# Patient Record
Sex: FEMALE | Employment: UNEMPLOYED | RURAL
[De-identification: names, ages, dates, MRNs, and addresses within clinical notes are randomized per-mention and may not be internally consistent; named-entity substitution may affect disease eponyms.]

---

## 2024-05-08 LAB
ABO AND RH: NORMAL
ANTIBODY SCREEN: NEGATIVE
C TRACH RRNA SPEC QL PROBE: NEGATIVE
HBV SURFACE AG SERPL QL IA: NEGATIVE
HCT VFR BLD AUTO: 39 %
HGB BLD-MCNC: 13 G/DL
HIV 1+2 AB+HIV1 P24 AG SERPL QL IA: NORMAL
MCV RBC AUTO: 83.9 FL (ref 82–108)
N GONORRHOEAE, AMPLIFIED DNA: NEGATIVE
PLATELET # BLD AUTO: 269 K/UL (ref 150–399)
RPR: NORMAL
RUBELLA IGG SCREEN: NORMAL
URINE CULTURE, ROUTINE: NORMAL

## 2024-05-29 LAB — FINAL PATHOLOGIC DIAGNOSIS: NORMAL

## 2024-06-13 DIAGNOSIS — N91.1 SECONDARY AMENORRHEA: Primary | ICD-10-CM

## 2024-06-20 ENCOUNTER — TELEPHONE (OUTPATIENT)
Dept: OBSTETRICS AND GYNECOLOGY | Facility: CLINIC | Age: 39
End: 2024-06-20

## 2024-06-20 NOTE — TELEPHONE ENCOUNTER
----- Message from Jenn Cowart sent at 6/20/2024 10:24 AM CDT -----  Who Called: Dalila Adamson    Caller is requesting assistance/information from provider's office.        Preferred Method of Contact: Phone Call  Patient's Preferred Phone Number on File: [phone]   Best Call Back Number, if different:  Additional Information: Pt is wanting to know about possibly touring facility. NP of Dr. Ibarra

## 2024-07-01 ENCOUNTER — HOSPITAL ENCOUNTER (OUTPATIENT)
Dept: RADIOLOGY | Facility: HOSPITAL | Age: 39
Discharge: HOME | End: 2024-07-01
Attending: ADVANCED PRACTICE MIDWIFE
Payer: COMMERCIAL

## 2024-07-01 ENCOUNTER — INITIAL PRENATAL (OUTPATIENT)
Dept: OBSTETRICS AND GYNECOLOGY | Facility: CLINIC | Age: 39
End: 2024-07-01
Payer: COMMERCIAL

## 2024-07-01 VITALS — SYSTOLIC BLOOD PRESSURE: 110 MMHG | HEART RATE: 78 BPM | WEIGHT: 143 LBS | DIASTOLIC BLOOD PRESSURE: 70 MMHG

## 2024-07-01 DIAGNOSIS — Z3A.20 20 WEEKS GESTATION OF PREGNANCY: ICD-10-CM

## 2024-07-01 DIAGNOSIS — O09.512 PRIMIGRAVIDA OF ADVANCED MATERNAL AGE IN SECOND TRIMESTER: Primary | ICD-10-CM

## 2024-07-01 DIAGNOSIS — N91.1 SECONDARY AMENORRHEA: ICD-10-CM

## 2024-07-01 LAB
BILIRUB SERPL-MCNC: NORMAL MG/DL
BLOOD URINE, POC: NORMAL
COLOR, POC UA: YELLOW
GLUCOSE UR QL STRIP: NORMAL
KETONES UR QL STRIP: NORMAL
LEUKOCYTE ESTERASE URINE, POC: NORMAL
NITRITE, POC UA: NORMAL
PH, POC UA: 5
PROTEIN, POC: NORMAL
SPECIFIC GRAVITY, POC UA: 1.02
UROBILINOGEN, POC UA: NORMAL

## 2024-07-01 PROCEDURE — 81003 URINALYSIS AUTO W/O SCOPE: CPT | Mod: PBBFAC | Performed by: ADVANCED PRACTICE MIDWIFE

## 2024-07-01 PROCEDURE — 99999 PR PBB SHADOW E&M-EST. PATIENT-LVL III: CPT | Mod: PBBFAC,,, | Performed by: ADVANCED PRACTICE MIDWIFE

## 2024-07-01 PROCEDURE — 99213 OFFICE O/P EST LOW 20 MIN: CPT | Mod: PBBFAC | Performed by: ADVANCED PRACTICE MIDWIFE

## 2024-07-01 PROCEDURE — 0500F INITIAL PRENATAL CARE VISIT: CPT | Mod: S$PBB,,, | Performed by: ADVANCED PRACTICE MIDWIFE

## 2024-07-01 PROCEDURE — 99999PBSHW POCT URINALYSIS W/O SCOPE: Mod: PBBFAC,,,

## 2024-07-01 RX ORDER — ONDANSETRON 4 MG/1
TABLET, ORALLY DISINTEGRATING ORAL
COMMUNITY

## 2024-07-01 NOTE — PROGRESS NOTES
Return OB Visit    38 y.o. female  at 20w0d   [facility]  Saw Dr. Barrie MATHEWS last week and US WNL.  Progress notes available and reviewed. No PN records available to review.  Records release signed today to obtain previous records.   Reports occasional fetal movement or fluttering. Denies any vaginal bleeding, leakage of fluid, cramping, contractions, or pressure.   Total weight gain/weight loss in pregnancy: 1.361 kg (3 lb)     Vitals  BP: 110/70  Pulse: 78  Weight: 64.9 kg (143 lb)  Prenatal  Fetal Heart Rate: 140  Movement: Present  Edema  LLE Edema: None  RLE Edema: None  Facial: None  Additional Edema?: No    Prenatal Labs:  Lab Results   Component Value Date    HGB 13.0 2024    HCT 39.0 2024     2024    HEPBSAG Negative 2024    [id number] Non-Reactive 2024    RPR Non-Reactive 2024    LABCHLA Negative 2024    LABNGO Negative 2024    LABURIN No growth at 48 hours 2024     Horizon screen Neg   Panorama low risk/female  Pap 24 Negative    A: 20w0d           ICD-10-CM ICD-9-CM    1. Primigravida of advanced maternal age in second trimester  O09.512 659.53       2. 20 weeks gestation of pregnancy  Z3A.20 V22.2 POCT URINALYSIS W/O SCOPE          The following were addressed during this visit:    1-8 Weeks  - Warning Signs   - Course of Care   - Benefits of Breastfeeding     13-16 Weeks  - Anatomy Ultrasound     -Benefits of breastfeeding   -Rooming In and Skin to Skin    P: Bleeding and  labor/labor precautions discussed.    Discussed midwifery care in collaboration with Dr Amaya.  Reviewed healthy diet, exercise during pregnancy and weight gain recommendations.  Safe OTC med list given and reviewed.    Encouraged [employer] access    Questions answered to desired level of satisfaction  Verbalized understanding to all information and instructions provided.  Follow up in about 4 weeks (around 2024) for  OBV.    Clarisa Ibarra CNM, [facility]

## 2024-07-08 ENCOUNTER — PATIENT MESSAGE (OUTPATIENT)
Dept: OBSTETRICS AND GYNECOLOGY | Facility: CLINIC | Age: 39
End: 2024-07-08
Payer: COMMERCIAL

## 2024-07-08 RX ORDER — HYDROCORTISONE 25 MG/G
CREAM TOPICAL 2 TIMES DAILY
Qty: 28 G | Refills: 3 | Status: SHIPPED | OUTPATIENT
Start: 2024-07-08

## 2024-07-29 ENCOUNTER — ROUTINE PRENATAL (OUTPATIENT)
Dept: OBSTETRICS AND GYNECOLOGY | Facility: CLINIC | Age: 39
End: 2024-07-29
Payer: COMMERCIAL

## 2024-07-29 VITALS — SYSTOLIC BLOOD PRESSURE: 110 MMHG | HEART RATE: 75 BPM | DIASTOLIC BLOOD PRESSURE: 78 MMHG | WEIGHT: 152 LBS

## 2024-07-29 DIAGNOSIS — O09.512 PRIMIGRAVIDA OF ADVANCED MATERNAL AGE IN SECOND TRIMESTER: Primary | ICD-10-CM

## 2024-07-29 DIAGNOSIS — Z3A.24 24 WEEKS GESTATION OF PREGNANCY: ICD-10-CM

## 2024-07-29 DIAGNOSIS — R35.0 URINARY FREQUENCY: ICD-10-CM

## 2024-07-29 LAB
BILIRUB SERPL-MCNC: NORMAL MG/DL
BLOOD URINE, POC: NORMAL
CLARITY, POC UA: CLEAR
COLOR, POC UA: COLORLESS
GLUCOSE UR QL STRIP: NORMAL
KETONES UR QL STRIP: NORMAL
LEUKOCYTE ESTERASE URINE, POC: NORMAL
NITRITE, POC UA: NORMAL
PH, POC UA: 5
PROTEIN, POC: NORMAL
SPECIFIC GRAVITY, POC UA: 1.01
UROBILINOGEN, POC UA: NORMAL

## 2024-07-29 PROCEDURE — 81003 URINALYSIS AUTO W/O SCOPE: CPT | Mod: PBBFAC | Performed by: ADVANCED PRACTICE MIDWIFE

## 2024-07-29 PROCEDURE — 99999PBSHW POCT URINALYSIS W/O SCOPE: Mod: PBBFAC,,,

## 2024-07-29 PROCEDURE — 99999 PR PBB SHADOW E&M-EST. PATIENT-LVL III: CPT | Mod: PBBFAC,,, | Performed by: ADVANCED PRACTICE MIDWIFE

## 2024-07-29 PROCEDURE — 99213 OFFICE O/P EST LOW 20 MIN: CPT | Mod: PBBFAC | Performed by: ADVANCED PRACTICE MIDWIFE

## 2024-07-29 PROCEDURE — 0502F SUBSEQUENT PRENATAL CARE: CPT | Mod: ,,, | Performed by: ADVANCED PRACTICE MIDWIFE

## 2024-07-29 NOTE — PROGRESS NOTES
Return OB Visit    38 y.o. female  at 24w0d   She c/o leg cramps taking 1 magnesium tablet without relief. Recommends 2 tablets before bed, increase hydration and leg stretches. Discussed normal discomforts in pregnancy.  Reports good fetal movement or fluttering. Denies any vaginal bleeding, leakage of fluid, cramping, contractions, or pressure.   Total weight gain/weight loss in pregnancy: 5.443 kg (12 lb)     Vitals  BP: 110/78  Pulse: 75  Weight: 68.9 kg (152 lb)  Prenatal  Fetal Heart Rate: 160  Movement: Present  Edema  LLE Edema: None  RLE Edema: None  Facial: None  Additional Edema?: No    Prenatal Labs:  Lab Results   Component Value Date    HGB 13.0 2024    HCT 39.0 2024     2024    HEPBSAG Negative 2024    [id number] Non-Reactive 2024    RPR Non-Reactive 2024    LABCHLA Negative 2024    LABNGO Negative 2024    LABURIN No growth at 48 hours 2024       A: 24w0d           ICD-10-CM ICD-9-CM    1. Primigravida of advanced maternal age in second trimester  O09.512 659.53 Treponema Pallidum (Syphillis) Antibody      CBC Auto Differential      Glucose, 1Hr Post Prandial      2. 24 weeks gestation of pregnancy  Z3A.24 V22.2 POCT URINALYSIS W/O SCOPE      3. Urinary frequency  R35.0 788.41 POCT URINALYSIS W/O SCOPE          The following were addressed during this visit:    21-24 Weeks  -  Labor Signs   - Travel During Pregnancy   - Gestational diabetes screening protocol   - Effective Position and Latch   - Risks of Formula Use   - Risks of pacifier use     -Benefits of breastfeeding   -Rooming In and Skin to Skin    P: Bleeding, daily fetal kick counts, and  labor/labor precautions discussed.    Questions answered to desired level of satisfaction  Verbalized understanding to all information and instructions provided.  Follow up in about 4 weeks (around 2024) for OBV, 1hr GTT.    Clarisa Ibarra CNM, [facility]

## 2024-08-26 ENCOUNTER — PATIENT MESSAGE (OUTPATIENT)
Dept: OBSTETRICS AND GYNECOLOGY | Facility: CLINIC | Age: 39
End: 2024-08-26
Payer: COMMERCIAL

## 2024-08-26 ENCOUNTER — ROUTINE PRENATAL (OUTPATIENT)
Dept: OBSTETRICS AND GYNECOLOGY | Facility: CLINIC | Age: 39
End: 2024-08-26
Payer: COMMERCIAL

## 2024-08-26 VITALS — HEART RATE: 87 BPM | WEIGHT: 157.19 LBS | SYSTOLIC BLOOD PRESSURE: 126 MMHG | DIASTOLIC BLOOD PRESSURE: 74 MMHG

## 2024-08-26 DIAGNOSIS — Z3A.28 28 WEEKS GESTATION OF PREGNANCY: ICD-10-CM

## 2024-08-26 DIAGNOSIS — O99.810 ABNORMAL GLUCOSE TOLERANCE IN PREGNANCY: Primary | ICD-10-CM

## 2024-08-26 DIAGNOSIS — O09.512 PRIMIGRAVIDA OF ADVANCED MATERNAL AGE IN SECOND TRIMESTER: Primary | ICD-10-CM

## 2024-08-26 DIAGNOSIS — R35.0 URINARY FREQUENCY: ICD-10-CM

## 2024-08-26 LAB
BILIRUB SERPL-MCNC: NORMAL MG/DL
BLOOD URINE, POC: NORMAL
CLARITY, POC UA: CLEAR
COLOR, POC UA: YELLOW
GLUCOSE UR QL STRIP: NORMAL
KETONES UR QL STRIP: NORMAL
LEUKOCYTE ESTERASE URINE, POC: NORMAL
NITRITE, POC UA: NORMAL
PH, POC UA: 5
PROTEIN, POC: NORMAL
SPECIFIC GRAVITY, POC UA: 1.02
UROBILINOGEN, POC UA: NORMAL

## 2024-08-26 PROCEDURE — 99213 OFFICE O/P EST LOW 20 MIN: CPT | Mod: PBBFAC | Performed by: ADVANCED PRACTICE MIDWIFE

## 2024-08-26 PROCEDURE — 99999PBSHW POCT URINALYSIS W/O SCOPE: Mod: PBBFAC,,,

## 2024-08-26 PROCEDURE — 99999 PR PBB SHADOW E&M-EST. PATIENT-LVL III: CPT | Mod: PBBFAC,,, | Performed by: ADVANCED PRACTICE MIDWIFE

## 2024-08-26 PROCEDURE — 0502F SUBSEQUENT PRENATAL CARE: CPT | Mod: ,,, | Performed by: ADVANCED PRACTICE MIDWIFE

## 2024-08-26 PROCEDURE — 81003 URINALYSIS AUTO W/O SCOPE: CPT | Mod: PBBFAC | Performed by: ADVANCED PRACTICE MIDWIFE

## 2024-08-26 NOTE — PROGRESS NOTES
Return OB Visit    39 y.o. female  at 28w0d   She c/o possible fever yesterday.  Was packing to move on Saturday. Rested yesterday and felt better later that afternoon. 1hr GTT today  Reports good fetal movement or fluttering. Denies any vaginal bleeding, leakage of fluid, cramping, contractions, or pressure.   Total weight gain/weight loss in pregnancy: 7.802 kg (17 lb 3.2 oz)     Vitals  BP: 126/74  Pulse: 87  Weight: 71.3 kg (157 lb 3.2 oz)  Prenatal  Fundal Height (cm): 29 cm  Fetal Heart Rate: 150s  Edema  LLE Edema: None  RLE Edema: None  Facial: None  Additional Edema?: No    Prenatal Labs:  Lab Results   Component Value Date    HGB 13.0 2024    HCT 39.0 2024     2024    HEPBSAG Negative 2024    [id number] Non-Reactive 2024    RPR Non-Reactive 2024    LABCHLA Negative 2024    LABNGO Negative 2024    LABURIN No growth at 48 hours 2024       A: 28w0d           ICD-10-CM ICD-9-CM    1. Primigravida of advanced maternal age in second trimester  O09.512 659.53 US OB FU Ea Gestation Transabdominal      2. 28 weeks gestation of pregnancy  Z3A.28 V22.2       3. Urinary frequency  R35.0 788.41 POCT URINALYSIS W/O SCOPE          The following were addressed during this visit:    25-28 Weeks  -  Labor Signs   - Weight Gain/Diet/Exercise     -Benefits of breastfeeding   -Rooming In and Skin to Skin    P: Bleeding, daily fetal kick counts, and  labor/labor precautions discussed.    Questions answered to desired level of satisfaction  Verbalized understanding to all information and instructions provided.  Follow up in about 2 weeks (around 2024) for OBV, Ultrasound.    Clarisa Ibarra CNM, [facility]

## 2024-08-27 ENCOUNTER — PATIENT MESSAGE (OUTPATIENT)
Dept: OBSTETRICS AND GYNECOLOGY | Facility: CLINIC | Age: 39
End: 2024-08-27
Payer: COMMERCIAL

## 2024-08-27 DIAGNOSIS — O24.419 GESTATIONAL DIABETES MELLITUS (GDM) IN THIRD TRIMESTER, GESTATIONAL DIABETES METHOD OF CONTROL UNSPECIFIED: Primary | ICD-10-CM

## 2024-08-28 ENCOUNTER — HOSPITAL ENCOUNTER (EMERGENCY)
Facility: HOSPITAL | Age: 39
Discharge: HOME | End: 2024-08-28
Payer: COMMERCIAL

## 2024-08-28 VITALS
HEIGHT: 64 IN | BODY MASS INDEX: 26.63 KG/M2 | TEMPERATURE: 98 F | DIASTOLIC BLOOD PRESSURE: 82 MMHG | WEIGHT: 156 LBS | RESPIRATION RATE: 16 BRPM | HEART RATE: 86 BPM | SYSTOLIC BLOOD PRESSURE: 129 MMHG | OXYGEN SATURATION: 99 %

## 2024-08-28 DIAGNOSIS — L03.114 CELLULITIS OF LEFT UPPER EXTREMITY: Primary | ICD-10-CM

## 2024-08-28 LAB
ANION GAP SERPL CALCULATED.3IONS-SCNC: 11 MMOL/L (ref 7–16)
BASOPHILS # BLD AUTO: 0.04 K/UL (ref 0–0.2)
BASOPHILS NFR BLD AUTO: 0.3 % (ref 0–1)
BUN SERPL-MCNC: 10 MG/DL (ref 7–18)
BUN/CREAT SERPL: 16 (ref 6–20)
CALCIUM SERPL-MCNC: 9.9 MG/DL (ref 8.5–10.1)
CHLORIDE SERPL-SCNC: 105 MMOL/L (ref 98–107)
CO2 SERPL-SCNC: 23 MMOL/L (ref 21–32)
CREAT SERPL-MCNC: 0.63 MG/DL (ref 0.55–1.02)
DIFFERENTIAL METHOD BLD: ABNORMAL
EGFR (NO RACE VARIABLE) (RUSH/TITUS): 117 ML/MIN/1.73M2
EOSINOPHIL # BLD AUTO: 0.32 K/UL (ref 0–0.5)
EOSINOPHIL NFR BLD AUTO: 2.4 % (ref 1–4)
ERYTHROCYTE [DISTWIDTH] IN BLOOD BY AUTOMATED COUNT: 11.9 % (ref 11.5–14.5)
GLUCOSE SERPL-MCNC: 120 MG/DL (ref 74–106)
HCT VFR BLD AUTO: 35.1 % (ref 38–47)
HGB BLD-MCNC: 11.8 G/DL (ref 12–16)
IMM GRANULOCYTES # BLD AUTO: 0.07 K/UL (ref 0–0.04)
IMM GRANULOCYTES NFR BLD: 0.5 % (ref 0–0.4)
LYMPHOCYTES # BLD AUTO: 2.32 K/UL (ref 1–4.8)
LYMPHOCYTES NFR BLD AUTO: 17.5 % (ref 27–41)
MCH RBC QN AUTO: 28.2 PG (ref 27–31)
MCHC RBC AUTO-ENTMCNC: 33.6 G/DL (ref 32–36)
MCV RBC AUTO: 84 FL (ref 80–96)
MONOCYTES # BLD AUTO: 0.95 K/UL (ref 0–0.8)
MONOCYTES NFR BLD AUTO: 7.2 % (ref 2–6)
MPC BLD CALC-MCNC: 10.2 FL (ref 9.4–12.4)
NEUTROPHILS # BLD AUTO: 9.55 K/UL (ref 1.8–7.7)
NEUTROPHILS NFR BLD AUTO: 72.1 % (ref 53–65)
NRBC # BLD AUTO: 0 X10E3/UL
NRBC, AUTO (.00): 0 %
PLATELET # BLD AUTO: 273 K/UL (ref 150–400)
POTASSIUM SERPL-SCNC: 3.1 MMOL/L (ref 3.5–5.1)
RBC # BLD AUTO: 4.18 M/UL (ref 4.2–5.4)
SODIUM SERPL-SCNC: 136 MMOL/L (ref 136–145)
WBC # BLD AUTO: 13.25 K/UL (ref 4.5–11)

## 2024-08-28 PROCEDURE — 36415 COLL VENOUS BLD VENIPUNCTURE: CPT

## 2024-08-28 PROCEDURE — 63600175 PHARM REV CODE 636 W HCPCS

## 2024-08-28 PROCEDURE — 25000003 PHARM REV CODE 250

## 2024-08-28 PROCEDURE — 85025 COMPLETE CBC W/AUTO DIFF WBC: CPT

## 2024-08-28 PROCEDURE — 80048 BASIC METABOLIC PNL TOTAL CA: CPT

## 2024-08-28 PROCEDURE — 99283 EMERGENCY DEPT VISIT LOW MDM: CPT

## 2024-08-28 RX ORDER — FAMOTIDINE 20 MG/1
20 TABLET, FILM COATED ORAL
Status: COMPLETED | OUTPATIENT
Start: 2024-08-28 | End: 2024-08-28

## 2024-08-28 RX ORDER — PREDNISONE 10 MG/1
20 TABLET ORAL
Status: COMPLETED | OUTPATIENT
Start: 2024-08-28 | End: 2024-08-28

## 2024-08-28 RX ORDER — DIPHENHYDRAMINE HCL 25 MG
25 CAPSULE ORAL
Status: COMPLETED | OUTPATIENT
Start: 2024-08-28 | End: 2024-08-28

## 2024-08-28 RX ORDER — CLINDAMYCIN HYDROCHLORIDE 300 MG/1
300 CAPSULE ORAL EVERY 6 HOURS
Qty: 40 CAPSULE | Refills: 0 | Status: SHIPPED | OUTPATIENT
Start: 2024-08-28 | End: 2024-09-07

## 2024-08-28 RX ADMIN — FAMOTIDINE 20 MG: 20 TABLET, FILM COATED ORAL at 09:08

## 2024-08-28 RX ADMIN — PREDNISONE 20 MG: 10 TABLET ORAL at 09:08

## 2024-08-28 RX ADMIN — DIPHENHYDRAMINE HYDROCHLORIDE 25 MG: 25 CAPSULE ORAL at 09:08

## 2024-08-29 NOTE — ED TRIAGE NOTES
Pt presents cc of ant bite from yesterday, today red streaks started around 1:30pm. Pt has had benedryl about an hour ago.

## 2024-08-29 NOTE — DISCHARGE INSTRUCTIONS
Take antibiotic as ordered. Take with food and drink plenty of water. Follow up with primary care provider in five days for re-evaluation. Return to the emergency department for new or worsening symptoms.

## 2024-08-29 NOTE — ED PROVIDER NOTES
Encounter Date: 8/28/2024       History     Chief Complaint   Patient presents with    Insect Bite     With red streaks going up her arm     Patient is a 39-year old female who presents to the emergency department for evaluation of ant bite. Patient reports that she was bitten by ants yesterday and began having pain and swelling to left hand with streaking up her left forearm this afternoon. Denies fever, chills, nausea, vomiting, shortness of breath, wheezing.    The history is provided by the patient. No  was used.   Animal Bite   The incident occurred yesterday. The incident occurred at home. She came to the ER via by private vehicle. There is an injury to the Left forearm and left hand. The pain is at a severity of 7/10. It is unlikely that a foreign body is present. Pertinent negatives include no chest pain, no altered mental status, no visual disturbance, no nausea, no vomiting, no bladder incontinence, no headaches, no inability to bear weight, no neck pain, no pain when bearing weight, no weakness and no difficulty breathing.     Review of patient's allergies indicates:  No Known Allergies  History reviewed. No pertinent past medical history.  Past Surgical History:   Procedure Laterality Date    TONSILLECTOMY       Family History   Problem Relation Name Age of Onset    Breast cancer Neg Hx      Colon cancer Neg Hx      Ovarian cancer Neg Hx       Social History     Tobacco Use    Smoking status: Never    Smokeless tobacco: Never   Substance Use Topics    Alcohol use: Not Currently    Drug use: Never     Review of Systems   Constitutional:  Negative for chills and fever.   HENT:  Negative for congestion, facial swelling and trouble swallowing.    Eyes:  Negative for pain and visual disturbance.   Respiratory:  Negative for chest tightness, shortness of breath, wheezing and stridor.    Cardiovascular:  Negative for chest pain, palpitations and leg swelling.   Gastrointestinal:  Negative  for nausea and vomiting.   Genitourinary:  Negative for bladder incontinence, decreased urine volume, difficulty urinating and dysuria.   Musculoskeletal:  Negative for back pain, neck pain and neck stiffness.   Neurological:  Negative for dizziness, tremors, weakness and headaches.   Psychiatric/Behavioral:  Negative for confusion.    All other systems reviewed and are negative.      Physical Exam     Initial Vitals   BP Pulse Resp Temp SpO2   08/28/24 2016 08/28/24 2016 08/28/24 2018 08/28/24 2016 08/28/24 2016   129/82 86 16 98 °F (36.7 °C) 99 %      MAP       --                Physical Exam    Vitals reviewed.  Constitutional: She appears well-nourished. No distress.   HENT:   Head: Normocephalic.   Eyes: Conjunctivae are normal. Pupils are equal, round, and reactive to light. Right eye exhibits no discharge. Left eye exhibits no discharge.   Neck: Neck supple.   Normal range of motion.  Cardiovascular:  Normal rate, regular rhythm and normal heart sounds.           Pulmonary/Chest: Breath sounds normal. No respiratory distress. She has no wheezes. She has no rhonchi. She exhibits no tenderness.   Musculoskeletal:      Cervical back: Normal range of motion and neck supple.     Lymphadenopathy:     She has no cervical adenopathy.   Neurological: She is alert and oriented to person, place, and time. GCS score is 15. GCS eye subscore is 4. GCS verbal subscore is 5. GCS motor subscore is 6.   Skin: Skin is warm and dry. Capillary refill takes less than 2 seconds.   Swelling and erythema noted to left hand with no bleeding or drainage noted. Streaking noted up left forearm with no complaints of pruritus/swelling to left forearm   Psychiatric: She has a normal mood and affect. Her behavior is normal.         Medical Screening Exam   See Full Note    ED Course   Procedures  Labs Reviewed   BASIC METABOLIC PANEL - Abnormal       Result Value    Sodium 136      Potassium 3.1 (*)     Chloride 105      CO2 23      Anion  Gap 11      Glucose 120 (*)     BUN 10      Creatinine 0.63      BUN/Creatinine Ratio 16      Calcium 9.9      eGFR 117     CBC WITH DIFFERENTIAL - Abnormal    WBC 13.25 (*)     RBC 4.18 (*)     Hemoglobin 11.8 (*)     Hematocrit 35.1 (*)     MCV 84.0      MCH 28.2      MCHC 33.6      RDW 11.9      Platelet Count 273      MPV 10.2      Neutrophils % 72.1 (*)     Lymphocytes % 17.5 (*)     Monocytes % 7.2 (*)     Eosinophils % 2.4      Basophils % 0.3      Immature Granulocytes % 0.5 (*)     nRBC, Auto 0.0      Neutrophils, Abs 9.55 (*)     Lymphocytes, Absolute 2.32      Monocytes, Absolute 0.95 (*)     Eosinophils, Absolute 0.32      Basophils, Absolute 0.04      Immature Granulocytes, Absolute 0.07 (*)     nRBC, Absolute 0.00      Diff Type Auto     CBC W/ AUTO DIFFERENTIAL    Narrative:     The following orders were created for panel order CBC auto differential.  Procedure                               Abnormality         Status                     ---------                               -----------         ------                     CBC with Differential[[id number]]       Abnormal            Final result                 Please view results for these tests on the individual orders.          Imaging Results    None          Medications   predniSONE tablet 20 mg (20 mg Oral Given 8/28/24 2109)   famotidine tablet 20 mg (20 mg Oral Given 8/28/24 2109)   diphenhydrAMINE capsule 25 mg (25 mg Oral Given 8/28/24 2109)     Medical Decision Making  Patient is a 39-year old female who presents to the emergency department for evaluation of ant bite. Patient reports that she was bitten by ants yesterday and began having pain and swelling to left hand with streaking up her left forearm this afternoon. Denies fever, chills, nausea, vomiting, shortness of breath, wheezing.  Ordered and reviewed labs with results significant for elevated WBC  Ordered benadryl, prednisone and pepcid PO in ED  Prescribed clindamycin for  discharge  Diagnosis: Cellulitis of left upper extremity    Amount and/or Complexity of Data Reviewed  Labs: ordered.    Risk  OTC drugs.  Prescription drug management.                                      Clinical Impression:   Final diagnoses:  [L03.114] Cellulitis of left upper extremity (Primary)        ED Disposition Condition    Discharge Stable          ED Prescriptions       Medication Sig Dispense Start Date End Date Auth. Provider    clindamycin (CLEOCIN) 300 MG capsule Take 1 capsule (300 mg total) by mouth every 6 (six) hours. for 10 days 40 capsule 8/28/2024 9/7/2024 Neeraj Pond, MARTÍNEZ          Follow-up Information    None          Neeraj Pond, MARTÍNEZ  08/28/24 5609

## 2024-08-30 ENCOUNTER — PATIENT MESSAGE (OUTPATIENT)
Dept: OBSTETRICS AND GYNECOLOGY | Facility: CLINIC | Age: 39
End: 2024-08-30
Payer: COMMERCIAL

## 2024-08-31 ENCOUNTER — PATIENT MESSAGE (OUTPATIENT)
Dept: OBSTETRICS AND GYNECOLOGY | Facility: CLINIC | Age: 39
End: 2024-08-31
Payer: COMMERCIAL

## 2024-09-03 ENCOUNTER — PATIENT MESSAGE (OUTPATIENT)
Dept: DIABETES | Facility: CLINIC | Age: 39
End: 2024-09-03
Payer: COMMERCIAL

## 2024-09-03 RX ORDER — BLOOD-GLUCOSE CONTROL, NORMAL
100 EACH MISCELLANEOUS 4 TIMES DAILY
Qty: 100 EACH | Refills: 11 | Status: SHIPPED | OUTPATIENT
Start: 2024-09-03 | End: 2025-09-03

## 2024-09-03 RX ORDER — DEXTROSE 4 G
1 TABLET,CHEWABLE ORAL 4 TIMES DAILY
Qty: 1 EACH | Refills: 0 | Status: SHIPPED | OUTPATIENT
Start: 2024-09-03 | End: 2024-09-04

## 2024-09-04 ENCOUNTER — CLINICAL SUPPORT (OUTPATIENT)
Dept: DIABETES | Facility: CLINIC | Age: 39
End: 2024-09-04
Payer: COMMERCIAL

## 2024-09-04 VITALS — BODY MASS INDEX: 27.04 KG/M2 | HEIGHT: 64 IN | WEIGHT: 158.38 LBS

## 2024-09-04 DIAGNOSIS — O24.419 GESTATIONAL DIABETES MELLITUS (GDM) IN THIRD TRIMESTER, GESTATIONAL DIABETES METHOD OF CONTROL UNSPECIFIED: ICD-10-CM

## 2024-09-04 PROCEDURE — 99999PBSHW PR PBB SHADOW TECHNICAL ONLY FILED TO HB: Mod: PBBFAC,,,

## 2024-09-04 PROCEDURE — 99213 OFFICE O/P EST LOW 20 MIN: CPT | Mod: PBBFAC

## 2024-09-04 PROCEDURE — 99999 PR PBB SHADOW E&M-EST. PATIENT-LVL III: CPT | Mod: PBBFAC,,,

## 2024-09-04 PROCEDURE — G0108 DIAB MANAGE TRN  PER INDIV: HCPCS | Mod: PBBFAC | Performed by: INTERNAL MEDICINE

## 2024-09-04 NOTE — PROGRESS NOTES
"Diabetes Care Specialist Progress Note  Author: TEDDY MIRANDA RN  Date: 2024    Intake    Program Intake  Reason for Diabetes Program Visit: Initial Diabetes Assessment  Current diabetes risk level::  (N/A: Has Gestational Diabetes)  In the last 12 months, have you been to the ED or admitted to the hospital?  used emergency room services  Was the ER or hospital admission related to diabetes?: No  Permission to speak with others about care:: no    38 y/o failed her OGTT and referred to Diabetes Care Specialist for Gestational Diabetes Teaching. She is , 29w2d, AYAN 2024.  Referred by Clarisa Ibarra CNM    Weight: 71.8 kg (158 lb 6.4 oz)   Height: 5' 4" (162.6 cm)   Body mass index is 27.19 kg/m².    Lifestyle Coping Support & Clinical    Lifestyle/Coping/Support  Compared to other people your age, how would you rate your health?: Excellent  Does anyone in your family have diabetes or does anyone in your family support you in your diabetes care?:   Learning Barriers:: None  Culture or Shinto beliefs that may impact ability to access healthcare: No  Psychosocial/Coping Skills Assessment Completed: : Yes  Assessment indicates:: Adequate understanding  Area of need?: No    Diabetes Self-Management Skills Assessment    Medication Skills Assessment  Area of need?: No (No Diabetes Medication Prescribed)    Diabetes Disease Process/Treatment Options  Diabetes Type?: Gestational  When were you diagnosed?: Failed OGTT 2024  What are your goals for this education session?: Learn how to manage GD and have a healthy baby  Is patient aware of what causes diabetes?: No  Does patient understand the pathophysiology of diabetes?: No  Diabetes Disease Process/Treatment Options: Skills Assessment Completed: Yes  Assessment indicates:: Instruction Needed, Knowledge deficit  Area of need?: Yes    Nutrition/Healthy Eating  Meal Plan 24 Hour Recall - Breakfast: twice in the morinin:30: cereal (whole wheat " without sugar) organic milk from [employer] (2nd: 8am: Baking bread (sour dough with wheat, rye flower, honey) 1 slice with butter and ham, tea unsweeted  Meal Plan 24 Hour Recall - Lunch: 2pm: mashed potatoe's( made butter, milk, salt, pepper) sausage boiled, sourkraut, water  Meal Plan 24 Hour Recall - Dinner: Pizza (made herself with whole wheat flour) thin crust (tomato sauce, ham, mushrooms and cheese)  ate 3 slices. water  Meal Plan 24 Hour Recall - Snack: Yogert (us greek / whole milk) mix with berries Muesi  Meal Plan 24 Hour Recall - Beverage: Water, milk, diet drink  Who shops/cooks?: Self    Patient can identify foods that impact blood sugar.: no (see comments) (Going to  glucose meter after leaving here today.)  Challenges to healthy eating:: other (see comments) (Knowledge deficit of what to eat)  Nutrition/Healthy Eating Skills Assessment Completed:: Yes  Assessment indicates:: Knowledge deficit, Instruction Needed  Area of need?: Yes    Physical Activity/Exercise  Patient's daily activity level:: constantly moving  Physical Activity/Exercise Skills Assessment Completed: : Yes  Assessment indicates:: Knowledge deficit, Instruction Needed  Area of need?: Yes    Home Blood Glucose Monitoring  Patient states that blood sugar is checked at home daily.: no  Home Blood Glucose Monitoring Skills Assessment Completed: : Yes  Assessment indicates:: Instruction Needed, Knowledge deficit  Area of need?: Yes    Acute Complications  Acute Complications Skills Assessment Completed: : Yes  Assessment indicates:: Knowledge deficit, Instruction Needed  Area of need?: Yes    Chronic Complications  Chronic Complications Skills Assessment Completed: : Yes  Assessment indicates:: Knowledge deficit, Instruction Needed  Area of need?: Yes      Assessment Summary and Plan    Based on today's diabetes care assessment, the following areas of need were identified:          9/4/2024       Areas of Need   Medications/Current  Diabetes Treatment N/A: Not taking any medications for diabetes     Lifestyle Coping Support Has good support from  and family.      Diabetes Disease Process/Treatment Options Yes - Reviewed gestational diabetes pathophysiology, signs and symptoms, risk factors and treatment guidelines. Handout on Gestational Diabetes provided.      Nutrition/Healthy Eating Yes - See Care Plan     Physical Activity/Exercise Yes - See Care Plan     Home Blood Glucose Monitoring Yes - See Care Plan     Acute Complications Yes:  Discussed risk to her and baby during pregnancy if blood sugar isn't controlled. Risk to baby: grow too large, blood sugar low at birth, low oxygen, stillbirth, Jaundice, injury during delivery. Risk to her: high blood pressure, infections, preeclampsia, . Handout provided.      Chronic Complications Yes  - Discussed future risk to her and baby if blood sugar is not controlled during pregnancy.  Baby: obese and at risk for diabetes in the future and same for her if she doesn't make realistic lifestyle and behavior changes. Post pregnancy: (Continue eating healthy, exercising regularly, keep weight down.  Discussed prevention by getting blood sugar checked in 4-6 weeks after delivery and having regular checkups by HCP every 1-3 years as recommended.)  Handout provided.          Today's interventions were provided through individual discussion, instruction, and written materials were provided.      Patient verbalized understanding of instruction and written materials.  Pt was able to return back demonstration of instructions today. Patient understood key points, needs reinforcement and further instruction.     Diabetes Self-Management Care Plan:    Today's Diabetes Self-Management Care Plan was developed with Dalila's input. Dalila has agreed to work toward the following goal(s) to improve his/her overall diabetes control.      Care Plan: Diabetes Management        Problem: Healthy Eating          Goal: Eat 3 meals daily with 30-60g/2-4 servings of carbohydrates per meal    Start Date: 9/4/2024   Expected End Date: 9/9/2024   Barriers: No Barriers Identified   Note:    Reviewed the sources and role of Carbohydrate, Protein, and Fat and how each nutrient impact blood sugar. Provided meal-planning instruction via food lists, plate method, food labels.  Discussed carbohydrate counting and spacing. Discussed strategies for healthier dining out and replacing sugary beverages with water and other noncaloric, sugar free options.  A diet on GD provided including foods recommended and foods not recommended while Pregnant. Discussed eating 3 meals a day and not going 3-5 hours apart. This helps keep your blood glucose stable and helps you to be less hungry. Handouts on healthy choices / snacks provided.        Problem: Physical Activity and Exercise         Goal: Patient agrees to increase physical activity to a goal of 5 times per week for 30 minutes.    Start Date: 9/4/2024   Expected End Date: 9/9/2024   Barriers: No Barriers Identified   Note:    Discussed role of physical activity on reducing insulin resistance and improvement in overall glycemic control.  Instructed to walk 30 minutes a day 5 times per week.  Provided a handout on pregnancy and exercise when it's safe and not safe to exercise and call your HCP. Patient verbalizes understanding.        Problem: Blood Glucose Self-Monitoring         Goal: Patient agrees to check and record blood sugars 4 times per day. (Fasting ane 2 hours PP meals) Bring log on all visits    Start Date: 9/4/2024   Expected End Date: 9/9/2024   Barriers: No Barriers Identified   Note:    Reviewed benefits, techniques of self-monitoring blood glucose. Discussed appropriate timing and frequency to SMBG, education on parameters on when to notify provider and advised patient to bring logs to all appts with PCP/Endocrinologist/Diabetes Care Specialist. Handout provided on factors  that affect blood sugar and blood sugar parameters for people with GD. (<95 fasting and <120 2 hours PP)      Task: Reviewed the importance of self-monitoring blood glucose and keeping logs. Completed 9/4/2024        Task: Instructed on how to self-monitor blood glucose using a home glucometer, how to properly dispose of used strips and lancets after use, and how to appropriately store meter and supplies. Completed 9/4/2024        Task: Discussed ways to minimize pain when monitoring blood glucose. Completed 9/4/2024          Follow Up Plan     Follow up in about 5 days (around 9/9/2024).    Today's care plan and follow up schedule was discussed with patient.  Dalila verbalized understanding of the care plan, goals, and agrees to follow up plan.        The patient was encouraged to communicate with his/her health care provider/physician and care team regarding his/her condition(s) and treatment.  I provided the patient with my contact information today and encouraged to contact me via phone or [facility] Patient Portal as needed.     Length of Visit   Total Time: 90 Minutes

## 2024-09-04 NOTE — LETTER
September 4, 2024      Clarisa Ibarra CNM  [address] [facility] [place] [address]         Patient: Dalila Adamson   MR Number: [id number]   YOB: 1985   Date of Visit: 9/4/2024       Dear Clarisa Ibarra:    Thank you for referring Dalila for diabetes self-management education and support services. She had an initial visit today.  My note is in Epic.  She is to follow up with me on 9/9/2024.     Patient Outcomes:    Has GD failed OGTT.     Care Plan: Diabetes Management        Problem: Healthy Eating         Goal: Eat 3 meals daily with 30-60g/2-4 servings of carbohydrates per meal    Start Date: 9/4/2024   Expected End Date: 9/9/2024   Barriers: No Barriers Identified   Note:    Reviewed the sources and role of Carbohydrate, Protein, and Fat and how each nutrient impact blood sugar. Provided meal-planning instruction via food lists, plate method, food labels.  Discussed carbohydrate counting and spacing. Discussed strategies for healthier dining out and replacing sugary beverages with water and other noncaloric, sugar free options.  A diet on GD provided including foods recommended and foods not recommended while Pregnant. Discussed eating 3 meals a day and not going 3-5 hours apart. This helps keep your blood glucose stable and helps you to be less hungry. Handouts on healthy choices / snacks provided.      Task: Review the importance of balancing carbohydrates with each meal using portion control techniques to count servings of carbohydrate and label reading to identify serving size and amount of total carbs per serving. Completed 9/4/2024        Task: Provided Sample plate method and reviewed the use of the plate to estimate amounts of carbohydrate per meal. Completed 9/4/2024        Problem: Physical Activity and Exercise         Goal: Patient agrees to increase physical activity to a goal of 5 times per week for 30 minutes.    Start Date: 9/4/2024   Expected End Date: 9/9/2024   Barriers: No Barriers  Identified   Note:    Discussed role of physical activity on reducing insulin resistance and improvement in overall glycemic control.  Instructed to walk 30 minutes a day 5 times per week.  Provided a handout on pregnancy and exercise when it's safe and not safe to exercise and call your HCP. Patient verbalizes understanding.        Problem: Blood Glucose Self-Monitoring         Goal: Patient agrees to check and record blood sugars 4 times per day. (Fasting ane 2 hours PP meals) Bring log on all visits    Start Date: 9/4/2024   Expected End Date: 9/9/2024   Barriers: No Barriers Identified   Note:    Reviewed benefits, techniques of self-monitoring blood glucose. Discussed appropriate timing and frequency to SMBG, education on parameters on when to notify provider and advised patient to bring logs to all appts with PCP/Endocrinologist/Diabetes Care Specialist. Handout provided on factors that affect blood sugar and blood sugar parameters for people with GD. (<95 fasting and <120 2 hours PP)      Task: Reviewed the importance of self-monitoring blood glucose and keeping logs. Completed 9/4/2024        Task: Instructed on how to self-monitor blood glucose using a home glucometer, how to properly dispose of used strips and lancets after use, and how to appropriately store meter and supplies. Completed 9/4/2024        Task: Discussed ways to minimize pain when monitoring blood glucose. Completed 9/4/2024          Follow up:   Dalila to attend medical appointments as scheduled  Dalila to update you on her DM education progress as needed      If you have questions, please do not hesitate to call me. I look forward to providing additional education and support as needed.    Sincerely,  Nathalie Patel RN  Diabetes Care and

## 2024-09-09 ENCOUNTER — CLINICAL SUPPORT (OUTPATIENT)
Dept: DIABETES | Facility: CLINIC | Age: 39
End: 2024-09-09
Payer: COMMERCIAL

## 2024-09-09 ENCOUNTER — HOSPITAL ENCOUNTER (OUTPATIENT)
Dept: RADIOLOGY | Facility: HOSPITAL | Age: 39
Discharge: HOME | End: 2024-09-09
Attending: ADVANCED PRACTICE MIDWIFE
Payer: COMMERCIAL

## 2024-09-09 ENCOUNTER — ROUTINE PRENATAL (OUTPATIENT)
Dept: OBSTETRICS AND GYNECOLOGY | Facility: CLINIC | Age: 39
End: 2024-09-09
Attending: ADVANCED PRACTICE MIDWIFE
Payer: COMMERCIAL

## 2024-09-09 VITALS
SYSTOLIC BLOOD PRESSURE: 133 MMHG | WEIGHT: 158 LBS | DIASTOLIC BLOOD PRESSURE: 80 MMHG | BODY MASS INDEX: 27.12 KG/M2 | HEART RATE: 89 BPM

## 2024-09-09 DIAGNOSIS — O24.410 DIET CONTROLLED GESTATIONAL DIABETES MELLITUS (GDM) IN THIRD TRIMESTER: Primary | ICD-10-CM

## 2024-09-09 DIAGNOSIS — R35.0 URINARY FREQUENCY: ICD-10-CM

## 2024-09-09 DIAGNOSIS — O09.512 PRIMIGRAVIDA OF ADVANCED MATERNAL AGE IN SECOND TRIMESTER: ICD-10-CM

## 2024-09-09 DIAGNOSIS — Z3A.30 30 WEEKS GESTATION OF PREGNANCY: ICD-10-CM

## 2024-09-09 DIAGNOSIS — O09.513 PRIMIGRAVIDA OF ADVANCED MATERNAL AGE IN THIRD TRIMESTER: Primary | ICD-10-CM

## 2024-09-09 PROCEDURE — 99999 PR PBB SHADOW E&M-EST. PATIENT-LVL III: CPT | Mod: PBBFAC,,, | Performed by: ADVANCED PRACTICE MIDWIFE

## 2024-09-09 PROCEDURE — G0108 DIAB MANAGE TRN  PER INDIV: HCPCS | Mod: PBBFAC | Performed by: INTERNAL MEDICINE

## 2024-09-09 PROCEDURE — 0502F SUBSEQUENT PRENATAL CARE: CPT | Mod: ,,, | Performed by: ADVANCED PRACTICE MIDWIFE

## 2024-09-09 PROCEDURE — 99212 OFFICE O/P EST SF 10 MIN: CPT | Mod: PBBFAC,25

## 2024-09-09 PROCEDURE — 76816 OB US FOLLOW-UP PER FETUS: CPT | Mod: 26,,, | Performed by: RADIOLOGY

## 2024-09-09 PROCEDURE — 99999 PR PBB SHADOW E&M-EST. PATIENT-LVL II: CPT | Mod: PBBFAC,,,

## 2024-09-09 PROCEDURE — 99213 OFFICE O/P EST LOW 20 MIN: CPT | Mod: PBBFAC,25 | Performed by: ADVANCED PRACTICE MIDWIFE

## 2024-09-09 PROCEDURE — 76816 OB US FOLLOW-UP PER FETUS: CPT | Mod: TC

## 2024-09-09 PROCEDURE — 99999PBSHW PR PBB SHADOW TECHNICAL ONLY FILED TO HB: Mod: PBBFAC,,,

## 2024-09-09 RX ORDER — FAMOTIDINE 40 MG/1
40 TABLET, FILM COATED ORAL NIGHTLY
Qty: 30 TABLET | Refills: 5 | Status: SHIPPED | OUTPATIENT
Start: 2024-09-09 | End: 2025-09-09

## 2024-09-09 NOTE — LETTER
September 9, 2024      Clarisa Ibarra CNM  [address] [facility] [place] [address]         Patient: Dalila Adamson   MR Number: [id number]   YOB: 1985   Date of Visit: 9/9/2024       Dear Clarisa Ibarra:    Thank you for referring Dalila for diabetes self-management education and support services. She has completed all components of our Diabetes Management Program. Below is a summary of her clinical outcomes and goal progress. She is to call me as needed.     Patient Outcomes:    Failed OGTT    Care Plan: Diabetes Management        Problem: Healthy Eating         Goal: Eat 3 meals daily with 30-60g/2-4 servings of carbohydrates per meal Completed 9/9/2024   Start Date: 9/4/2024   Expected End Date: 9/9/2024   This Visit's Progress: Met   Barriers: No Barriers Identified   Note:    Patient verbalized understanding on the source and role of Carbohydrate, Protein, and Fat and how each nutrient impacts blood sugar levels during pregnancy.          Problem: Physical Activity and Exercise         Goal: Patient agrees to increase physical activity to a goal of 5 times per week for 30 minutes. Completed 9/9/2024   Start Date: 9/4/2024   Expected End Date: 9/9/2024   Barriers: No Barriers Identified   Note:    Patient verbalizes understanding that physical activity helps control your blood glucose levels and provide other health benefits during pregnancy and after the baby is born.       Problem: Blood Glucose Self-Monitoring         Goal: Patient agrees to check and record blood sugars 4 times per day. (Fasting ane 2 hours PP meals) Bring log on all visits Completed 9/9/2024   Start Date: 9/4/2024   Expected End Date: 9/9/2024   This Visit's Progress: Met   Barriers: No Barriers Identified   Note:    Patient verbalizes understanding of appropriate timing, frequency and parameters to check SMBG and risk to her and baby if not controlled.  She verbalized understanding of factors that affect glucose and when to notify  provider if parameters are not in range.  Fastin,95, 94, 96.  2 hours pp breakfast: 91, 108, 111, 126.  2 hours PP lunch: 112, 109, 88, 106, 83.  2 hours pp Dinner: 85, 112, 99, 114, 102     Follow up:   Dalila to attend medical appointments as scheduled  Dalila to update you on her DM education progress as needed      If you have questions, please do not hesitate to call me. I look forward to providing additional education and support as needed.    Sincerely,  Nathalie Patel RN  Diabetes Care and

## 2024-09-09 NOTE — PROGRESS NOTES
"Return OB Visit    39 y.o. female  at 30w0d   She c/o difficulty breathing at night when she is laying down.  Has had some "heartburn". Denies h'ache or visual changes.  Fasting BS 86-96, 2hr PP   Reports good fetal movement or fluttering. Denies any vaginal bleeding, leakage of fluid, cramping, contractions, or pressure.   Total weight gain/weight loss in pregnancy: 8.165 kg (18 lb)   BP Recheck 118/78    Vitals  BP: 133/80  Pulse: 89  Weight: 71.7 kg (158 lb)  Prenatal  Fetal Heart Rate: 131  Movement: Present  Edema  LLE Edema: Mild pitting, slight indentation  RLE Edema: Mild pitting, slight indentation  Facial: None    Ultrasound today  EFW 3#7oz, 65%tile, DEEPTHI 17.3cm, vertex position, anterior placenta, CL 3.5cm    Prenatal Labs:  Lab Results   Component Value Date    HGB 11.8 (L) 2024    HCT 35.1 (L) 2024     2024    HEPBSAG Negative 2024    [id number] Non-Reactive 2024    RPR Non-Reactive 2024    LABCHLA Negative 2024    LABNGO Negative 2024    LABURIN No growth at 48 hours 2024     A: 30w0d           ICD-10-CM ICD-9-CM    1. Primigravida of advanced maternal age in third trimester  O09.513 659.53       2. 30 weeks gestation of pregnancy  Z3A.30 V22.2 famotidine (PEPCID) 40 MG tablet      3. Urinary frequency  R35.0 788.41 POCT URINALYSIS W/O SCOPE          No pregnancy checklist tasks were completed during this visit, and no tasks are pending completion.    -Benefits of breastfeeding   -Rooming In and Skin to Skin    P: Bleeding, daily fetal kick counts, and  labor/labor precautions discussed.  Keep head elevated more when sleeping  Pepcid at bedtime    Questions answered to desired level of satisfaction  Verbalized understanding to all information and instructions provided.  Follow up in about 2 weeks (around 2024) for OBV.    Clarisa Ibarra CNM, DALJIT-BC                "

## 2024-09-09 NOTE — PROGRESS NOTES
"Diabetes Care Specialist Progress Note  Author: TEDDY MIRANDA RN  Date: 2024    Intake    Program Intake  Reason for Diabetes Program Visit: Post Program Follow-Up (2nd visit post program)  Type of Intervention: Individual  Individual: Education  Education: Self-Management Skill Review, Nutrition and Meal Planning  Type of Follow-Up: Other (Gestational Diabetes follow up)      38 y/o 2nd visit today. She is , 30w0d, AYAN 2024.  Referred by Clarisa Ibarra CNM.   Dalila verbalizes understanding of how to self manage Gestational Diabetes and doesn't think she needs to follow up with Diabetes Care Specialist, but will call for any questions.      Weight: 71.8 kg (158 lb)   Height: 5' 4" (162.6 cm)   Body mass index is 27.12 kg/m².      Lifestyle Coping Support & Clinical  Diabetes Self-Management Skills Assessment    Medication Skills Assessment  Area of need?: No (Not prescribed diabetes medication.) Blood sugars controlled with diet and exercise    Nutrition/Healthy Eating  Meal Plan 24 Hour Recall - Breakfast: Whole wheat cereal with organic milk  Meal Plan 24 Hour Recall - Lunch: Spaghetti with meat, tomatos, carrots, salad  Meal Plan 24 Hour Recall - Dinner: grilled chicken with potatos and vetetables  Meal Plan 24 Hour Recall - Snack: Yogert greet, Muesi  Meal Plan 24 Hour Recall - Beverage: water, organic milk, Muesi (Reading labels)  Who shops/cooks?: Self       Assessment Summary and Plan    Based on today's diabetes care assessment, the following areas of need were identified:          2024      Areas of Need   Medications/Current Diabetes Treatment N/A (Diet Controlled)      Diabetes Disease Process/Treatment Options Patient verbalized understanding of (Gestational diabetes), s/s, risk factors and treatment options and how to self-manage it successfully.     Nutrition/Healthy Eating See Care Plan     Physical Activity/Exercise See Care Plan     Home Blood Glucose Monitoring See Care Plan   "   Acute Complications Patient verbalized understanding of risk to her and baby while pregnant if blood sugar isn't controlled. Risk to baby: grow too large, blood sugar low at birth, low oxygen, stillbirth, Jaundice, injury during delivery. Risk to her: high blood pressure, infections, preeclampsia, .      Chronic Complications Patient verbalizes future risk to her and baby post-delivery:  Baby: obese and at risk for developing diabetes in the future.  She plans to continue with lifestyle changes and to follow up with HCP for blood sugar checks to delay or prevent developing diabetes in the future.             Today's interventions were provided through individual discussion, instruction, and written materials were provided.      Patient verbalized understanding of instruction and written materials.  Pt was able to return back demonstration of instructions today. Patient understood key points, needs reinforcement and further instruction.     Diabetes Self-Management Care Plan:    Today's Diabetes Self-Management Care Plan was developed with Dalila's input. Dalila has agreed to work toward the following goal(s) to improve his/her overall diabetes control.      Care Plan: Diabetes Management        Problem: Healthy Eating         Goal: Eat 3 meals daily with 30-60g/2-4 servings of carbohydrates per meal Completed 2024   Start Date: 2024   Expected End Date: 2024   This Visit's Progress: Met   Barriers: No Barriers Identified   Note:    Patient verbalized understanding on the source and role of Carbohydrate, Protein, and Fat and how each nutrient impacts blood sugar levels during pregnancy.          Problem: Physical Activity and Exercise         Goal: Patient agrees to increase physical activity to a goal of 5 times per week for 30 minutes. Completed 2024   Start Date: 2024   Expected End Date: 2024   Barriers: No Barriers Identified   Note:    Patient verbalizes understanding that  physical activity helps control your blood glucose levels and provide other health benefits during pregnancy and after the baby is born.       Problem: Blood Glucose Self-Monitoring         Goal: Patient agrees to check and record blood sugars 4 times per day. (Fasting ane 2 hours PP meals) Bring log on all visits Completed 2024   Start Date: 2024   Expected End Date: 2024   This Visit's Progress: Met   Barriers: No Barriers Identified   Note:    Patient verbalizes understanding of appropriate timing, frequency and parameters to check SMBG and risk to her and baby if not controlled.  She verbalized understanding of factors that affect glucose and when to notify provider if parameters are not in range.  Fastin,95, 94, 96.  2 hours pp breakfast: 91, 108, 111, 126.  2 hours PP lunch: 112, 109, 88, 106, 83.  2 hours pp Dinner: 85, 112, 99, 114, 102       Follow Up Plan     Follow up if symptoms worsen or fail to improve.    Today's care plan and follow up schedule was discussed with patient.  Dalila verbalized understanding of the care plan, goals, and agrees to follow up plan.        The patient was encouraged to communicate with his/her health care provider/physician and care team regarding his/her condition(s) and treatment.  I provided the patient with my contact information today and encouraged to contact me via phone or [facility]'s Patient Portal as needed.     Length of Visit   Total Time: 30 Minutes

## 2024-09-23 ENCOUNTER — ROUTINE PRENATAL (OUTPATIENT)
Dept: OBSTETRICS AND GYNECOLOGY | Facility: CLINIC | Age: 39
End: 2024-09-23
Payer: COMMERCIAL

## 2024-09-23 VITALS
HEART RATE: 98 BPM | DIASTOLIC BLOOD PRESSURE: 80 MMHG | WEIGHT: 165 LBS | SYSTOLIC BLOOD PRESSURE: 142 MMHG | BODY MASS INDEX: 28.32 KG/M2

## 2024-09-23 DIAGNOSIS — Z3A.32 32 WEEKS GESTATION OF PREGNANCY: ICD-10-CM

## 2024-09-23 DIAGNOSIS — O12.03 GESTATIONAL EDEMA IN THIRD TRIMESTER: ICD-10-CM

## 2024-09-23 DIAGNOSIS — O09.513 PRIMIGRAVIDA OF ADVANCED MATERNAL AGE IN THIRD TRIMESTER: Primary | ICD-10-CM

## 2024-09-23 DIAGNOSIS — R35.0 URINARY FREQUENCY: ICD-10-CM

## 2024-09-23 LAB
CREAT UR-MCNC: 16 MG/DL (ref 28–219)
PROT UR-MCNC: <5 MG/DL (ref 0–11.9)
PROT/CREAT 24H UR-RTO: <0.313

## 2024-09-23 PROCEDURE — 99999 PR PBB SHADOW E&M-EST. PATIENT-LVL III: CPT | Mod: PBBFAC,,, | Performed by: ADVANCED PRACTICE MIDWIFE

## 2024-09-23 PROCEDURE — 0502F SUBSEQUENT PRENATAL CARE: CPT | Mod: ,,, | Performed by: ADVANCED PRACTICE MIDWIFE

## 2024-09-23 PROCEDURE — 99213 OFFICE O/P EST LOW 20 MIN: CPT | Mod: PBBFAC | Performed by: ADVANCED PRACTICE MIDWIFE

## 2024-09-23 PROCEDURE — 84156 ASSAY OF PROTEIN URINE: CPT | Mod: ,,, | Performed by: CLINICAL MEDICAL LABORATORY

## 2024-09-23 PROCEDURE — 82570 ASSAY OF URINE CREATININE: CPT | Mod: ,,, | Performed by: CLINICAL MEDICAL LABORATORY

## 2024-09-23 NOTE — PROGRESS NOTES
Return OB Visit    39 y.o. female  at 32w0d   She c/o back pain. FBS  2hr PP .  Pt aware of what she is eating that elevates her 2hr BS.  Denies h'ache or visual changes.   Reports good fetal movement or fluttering. Denies any vaginal bleeding, leakage of fluid, cramping, contractions, or pressure.   Total weight gain/weight loss in pregnancy: 11.3 kg (25 lb)     BP Recheck 130/72  Vitals  BP: (!) 142/80  Pulse: 98  Weight: 74.8 kg (165 lb)  Prenatal  Fundal Height (cm): 32 cm  Fetal Heart Rate: 140s  Movement: Present  Edema  LLE Edema: Moderate pitting, indentation subsides rapidly  RLE Edema: Moderate pitting, indentation subsides rapidly  Facial: None  Additional Edema?: No    Prenatal Labs:  Lab Results   Component Value Date    HGB 11.8 (L) 2024    HCT 35.1 (L) 2024     2024    HEPBSAG Negative 2024    [id number] Non-Reactive 2024    RPR Non-Reactive 2024    LABCHLA Negative 2024    LABNGO Negative 2024    LABURIN No growth at 48 hours 2024       A: 32w0d           ICD-10-CM ICD-9-CM    1. Primigravida of advanced maternal age in third trimester  O09.513 659.53  OB Follow-up Ea Gestation Transabd      2. 32 weeks gestation of pregnancy  Z3A.32 V22.2 CANCELED: POCT URINALYSIS W/O SCOPE      3. Urinary frequency  R35.0 788.41 CANCELED: POCT URINALYSIS W/O SCOPE      4. Gestational edema in third trimester  O12.03 646.13 CBC Auto Differential      Comprehensive Metabolic Panel      Uric Acid      Protein/Creatinine Ratio, Urine      Protein/Creatinine Ratio, Urine          The following were addressed during this visit:    29-32 Weeks  - Contraception/Tubal Consent   - Pre-registration   - Op note review/ consent   - Birth Plan   - Pediatrician   - Fetal Kick Counts/PIH/PTL precautions   - Preeclampsia Education   - Quiet time     -Benefits of breastfeeding   -Rooming In and Skin to Skin    P: Bleeding, daily fetal kick counts,  and  labor/labor precautions discussed.  PreE labs today  PreE precautions reviewed    Questions answered to desired level of satisfaction  Verbalized understanding to all information and instructions provided.  Follow up in about 2 weeks (around 10/7/2024) for OBV, Ultrasound.    Clarisa Ibarra CNM, DALJIT-BC

## 2024-10-08 ENCOUNTER — ROUTINE PRENATAL (OUTPATIENT)
Dept: OBSTETRICS AND GYNECOLOGY | Facility: CLINIC | Age: 39
End: 2024-10-08
Attending: ADVANCED PRACTICE MIDWIFE
Payer: COMMERCIAL

## 2024-10-08 ENCOUNTER — HOSPITAL ENCOUNTER (OUTPATIENT)
Dept: RADIOLOGY | Facility: HOSPITAL | Age: 39
Discharge: HOME | End: 2024-10-08
Attending: ADVANCED PRACTICE MIDWIFE
Payer: COMMERCIAL

## 2024-10-08 VITALS
WEIGHT: 168 LBS | SYSTOLIC BLOOD PRESSURE: 132 MMHG | DIASTOLIC BLOOD PRESSURE: 80 MMHG | BODY MASS INDEX: 28.84 KG/M2 | HEART RATE: 88 BPM

## 2024-10-08 DIAGNOSIS — O09.513 PRIMIGRAVIDA OF ADVANCED MATERNAL AGE IN THIRD TRIMESTER: Primary | ICD-10-CM

## 2024-10-08 DIAGNOSIS — O12.03 GESTATIONAL EDEMA IN THIRD TRIMESTER: ICD-10-CM

## 2024-10-08 DIAGNOSIS — R35.0 URINARY FREQUENCY: ICD-10-CM

## 2024-10-08 DIAGNOSIS — O09.513 PRIMIGRAVIDA OF ADVANCED MATERNAL AGE IN THIRD TRIMESTER: ICD-10-CM

## 2024-10-08 DIAGNOSIS — Z3A.34 34 WEEKS GESTATION OF PREGNANCY: ICD-10-CM

## 2024-10-08 PROCEDURE — 99999PBSHW POCT URINALYSIS W/O SCOPE: Mod: PBBFAC,,,

## 2024-10-08 PROCEDURE — 99999 PR PBB SHADOW E&M-EST. PATIENT-LVL III: CPT | Mod: PBBFAC,,, | Performed by: ADVANCED PRACTICE MIDWIFE

## 2024-10-08 PROCEDURE — 0502F SUBSEQUENT PRENATAL CARE: CPT | Mod: ,,, | Performed by: ADVANCED PRACTICE MIDWIFE

## 2024-10-08 PROCEDURE — 76816 OB US FOLLOW-UP PER FETUS: CPT | Mod: TC

## 2024-10-08 PROCEDURE — 99213 OFFICE O/P EST LOW 20 MIN: CPT | Mod: PBBFAC,25 | Performed by: ADVANCED PRACTICE MIDWIFE

## 2024-10-08 PROCEDURE — 81003 URINALYSIS AUTO W/O SCOPE: CPT | Mod: PBBFAC | Performed by: ADVANCED PRACTICE MIDWIFE

## 2024-10-08 PROCEDURE — 76816 OB US FOLLOW-UP PER FETUS: CPT | Mod: 26,,, | Performed by: RADIOLOGY

## 2024-10-08 NOTE — PROGRESS NOTES
Return OB Visit    39 y.o. female  at 34w1d   She c/o cramping and back pain. FBS  2hr PP   Reports good fetal movement or fluttering. Denies any vaginal bleeding, leakage of fluid, contractions, or pressure.   Total weight gain/weight loss in pregnancy: 12.7 kg (28 lb)     Vitals  BP: 132/80  Pulse: 88  Weight: 76.2 kg (168 lb)  Prenatal  Fetal Heart Rate: 140  Movement: Present  Edema  LLE Edema: Mild pitting, slight indentation  RLE Edema: Mild pitting, slight indentation  Facial: None  Additional Edema?: No    Prenatal Labs:  Lab Results   Component Value Date    HGB 12.2 2024    HCT 36.8 (L) 2024     2024    HEPBSAG Negative 2024    [id number] Non-Reactive 2024    RPR Non-Reactive 2024    LABCHLA Negative 2024    LABNGO Negative 2024    LABURIN No growth at 48 hours 2024       A: 34w1d           ICD-10-CM ICD-9-CM    1. Primigravida of advanced maternal age in third trimester  O09.513 659.53       2. 34 weeks gestation of pregnancy  Z3A.34 V22.2 POCT URINALYSIS W/O SCOPE      3. Urinary frequency  R35.0 788.41 POCT URINALYSIS W/O SCOPE      4. Gestational edema in third trimester  O12.03 646.13       US today EFW: 5lb 8oz DEEPTHI: 18.1cm CL: 2.99cm Vertex     The following were addressed during this visit:    33-36 Weeks  - Childbirth Education/Hospital Tours   - Breastfeeding   - Fetal Kick Counts/PIH/PTL precautions     -Benefits of breastfeeding   -Rooming In and Skin to Skin    P: Bleeding, daily fetal kick counts, and  labor/labor precautions discussed.    Questions answered to desired level of satisfaction  Verbalized understanding to all information and instructions provided.  Follow up in about 2 weeks (around 10/22/2024) for OBV, GBS .    Clarisa Ibarra CNM, WHMARTÍNEZ-BC

## 2024-10-09 LAB
BILIRUB SERPL-MCNC: NORMAL MG/DL
BLOOD URINE, POC: NORMAL
CLARITY, UA: CLEAR
COLOR, UA: YELLOW
GLUCOSE UR QL STRIP: NORMAL
KETONES UR QL STRIP: NORMAL
LEUKOCYTE ESTERASE URINE, POC: NORMAL
NITRITE, POC UA: NORMAL
PH, POC UA: 5
PROTEIN, POC: NORMAL
SPECIFIC GRAVITY, POC UA: 1.02
UROBILINOGEN, POC UA: NORMAL

## 2024-10-22 ENCOUNTER — ROUTINE PRENATAL (OUTPATIENT)
Dept: OBSTETRICS AND GYNECOLOGY | Facility: CLINIC | Age: 39
End: 2024-10-22
Payer: COMMERCIAL

## 2024-10-22 VITALS
HEART RATE: 98 BPM | SYSTOLIC BLOOD PRESSURE: 132 MMHG | BODY MASS INDEX: 29.18 KG/M2 | WEIGHT: 170 LBS | DIASTOLIC BLOOD PRESSURE: 86 MMHG

## 2024-10-22 DIAGNOSIS — Z3A.36 36 WEEKS GESTATION OF PREGNANCY: ICD-10-CM

## 2024-10-22 DIAGNOSIS — O09.513 PRIMIGRAVIDA OF ADVANCED MATERNAL AGE IN THIRD TRIMESTER: Primary | ICD-10-CM

## 2024-10-22 DIAGNOSIS — O12.03 GESTATIONAL EDEMA IN THIRD TRIMESTER: ICD-10-CM

## 2024-10-22 DIAGNOSIS — R35.0 URINARY FREQUENCY: ICD-10-CM

## 2024-10-22 DIAGNOSIS — O24.410 DIET CONTROLLED GESTATIONAL DIABETES MELLITUS (GDM) IN THIRD TRIMESTER: ICD-10-CM

## 2024-10-22 LAB
CREAT UR-MCNC: 41 MG/DL (ref 28–219)
PROT UR-MCNC: 12.7 MG/DL (ref 0–11.9)
PROT/CREAT 24H UR-RTO: 0.31

## 2024-10-22 PROCEDURE — 82570 ASSAY OF URINE CREATININE: CPT | Mod: ,,, | Performed by: CLINICAL MEDICAL LABORATORY

## 2024-10-22 PROCEDURE — 0502F SUBSEQUENT PRENATAL CARE: CPT | Mod: ,,, | Performed by: ADVANCED PRACTICE MIDWIFE

## 2024-10-22 PROCEDURE — 99213 OFFICE O/P EST LOW 20 MIN: CPT | Mod: PBBFAC | Performed by: ADVANCED PRACTICE MIDWIFE

## 2024-10-22 PROCEDURE — 87653 STREP B DNA AMP PROBE: CPT | Mod: ,,, | Performed by: CLINICAL MEDICAL LABORATORY

## 2024-10-22 PROCEDURE — 84156 ASSAY OF PROTEIN URINE: CPT | Mod: ,,, | Performed by: CLINICAL MEDICAL LABORATORY

## 2024-10-22 PROCEDURE — 99999 PR PBB SHADOW E&M-EST. PATIENT-LVL III: CPT | Mod: PBBFAC,,, | Performed by: ADVANCED PRACTICE MIDWIFE

## 2024-10-22 NOTE — PROGRESS NOTES
Return OB Visit    39 y.o. female  at 36w1d   She c/o juanito jackson. FBS  2hrPP .  Denies h'ache or visual changes.  States she knows her BS are elevated when she eats sugar. Discussed diabetic diet and encouraged pt to stick with this for better blood sugar control.   Reports good fetal movement or fluttering. Denies any vaginal bleeding, leakage of fluid, cramping,  or pressure.   Total weight gain/weight loss in pregnancy: 13.6 kg (30 lb)     Vitals  BP: 132/86  Pulse: 98  Weight: 77.1 kg (170 lb)  Prenatal  Fetal Heart Rate: 140s  Movement: Present  Edema  LLE Edema: Mild pitting, slight indentation  RLE Edema: Mild pitting, slight indentation  Facial: None  Additional Edema?: No  Cervical Exam  Dilation: 1  Effacement (%): 50  Station: -3  Presentation: Vertex  Station (Labor Curve): 8 cm  Dilation/Effacement/Station  Dilation: 1  Effacement (%): 50  Station: -3    Prenatal Labs:  Lab Results   Component Value Date    HGB 12.2 2024    HCT 36.8 (L) 2024     2024    HEPBSAG Negative 2024    [id number] Non-Reactive 2024    RPR Non-Reactive 2024    LABCHLA Negative 2024    LABNGO Negative 2024    LABURIN No growth at 48 hours 2024       A: 36w1d           ICD-10-CM ICD-9-CM    1. Primigravida of advanced maternal age in third trimester  O09.513 659.53 Strep B Screen, Antepartum      US OB/GYN Procedure (Viewpoint) - Extended List - Future      2. Diet controlled gestational diabetes mellitus (GDM) in third trimester  O24.410 648.83       3. 36 weeks gestation of pregnancy  Z3A.36 V22.2 POCT URINALYSIS W/O SCOPE      Strep B Screen, Antepartum      4. Gestational edema in third trimester  O12.03 646.13 CBC Auto Differential      Comprehensive Metabolic Panel      Uric Acid      Protein/Creatinine Ratio, Urine      US OB/GYN Procedure (Viewpoint) - Extended List - Future      Protein/Creatinine Ratio, Urine      5. Urinary frequency  R35.0  788.41 POCT URINALYSIS W/O SCOPE          The following were addressed during this visit:    33-36 Weeks  - Group B Strep Test/HIV/RPR     -Benefits of breastfeeding   -Rooming In and Skin to Skin    P: Bleeding, daily fetal kick counts, and  labor/labor precautions discussed.  PreE precautions reviewed  Repeat PReE labs today    Questions answered to desired level of satisfaction  Verbalized understanding to all information and instructions provided.  Follow up in about 1 week (around 10/29/2024) for OBV, Ultrasound (BPP/ AMA, GDM).    Clarisa Ibarra CNM, WHNP-BC

## 2024-10-23 LAB — GROUP B STREP, PCR: NEGATIVE

## 2024-10-29 ENCOUNTER — HOSPITAL ENCOUNTER (OUTPATIENT)
Dept: OBSTETRICS AND GYNECOLOGY | Facility: CLINIC | Age: 39
Discharge: HOME | End: 2024-10-29
Payer: COMMERCIAL

## 2024-10-29 ENCOUNTER — ROUTINE PRENATAL (OUTPATIENT)
Dept: OBSTETRICS AND GYNECOLOGY | Facility: CLINIC | Age: 39
End: 2024-10-29
Payer: COMMERCIAL

## 2024-10-29 VITALS
BODY MASS INDEX: 29.52 KG/M2 | WEIGHT: 172 LBS | DIASTOLIC BLOOD PRESSURE: 84 MMHG | HEART RATE: 100 BPM | SYSTOLIC BLOOD PRESSURE: 132 MMHG

## 2024-10-29 DIAGNOSIS — O09.513 PRIMIGRAVIDA OF ADVANCED MATERNAL AGE IN THIRD TRIMESTER: Primary | ICD-10-CM

## 2024-10-29 DIAGNOSIS — Z3A.37 37 WEEKS GESTATION OF PREGNANCY: ICD-10-CM

## 2024-10-29 DIAGNOSIS — O12.03 GESTATIONAL EDEMA IN THIRD TRIMESTER: ICD-10-CM

## 2024-10-29 DIAGNOSIS — O09.513 PRIMIGRAVIDA OF ADVANCED MATERNAL AGE IN THIRD TRIMESTER: ICD-10-CM

## 2024-10-29 DIAGNOSIS — R35.0 URINARY FREQUENCY: ICD-10-CM

## 2024-10-29 DIAGNOSIS — O24.410 DIET CONTROLLED GESTATIONAL DIABETES MELLITUS (GDM) IN THIRD TRIMESTER: ICD-10-CM

## 2024-10-29 LAB
BILIRUB SERPL-MCNC: NORMAL MG/DL
BLOOD URINE, POC: NORMAL
CLARITY, UA: CLEAR
COLOR, UA: YELLOW
GLUCOSE UR QL STRIP: NORMAL
KETONES UR QL STRIP: NORMAL
LEUKOCYTE ESTERASE URINE, POC: NORMAL
NITRITE, POC UA: NORMAL
PH, POC UA: 6.5
PROTEIN, POC: NORMAL
SPECIFIC GRAVITY, POC UA: 1.02
UROBILINOGEN, POC UA: NORMAL

## 2024-10-29 PROCEDURE — 99999PBSHW POCT URINALYSIS W/O SCOPE: Mod: PBBFAC,,,

## 2024-10-29 PROCEDURE — 99213 OFFICE O/P EST LOW 20 MIN: CPT | Mod: PBBFAC | Performed by: ADVANCED PRACTICE MIDWIFE

## 2024-10-29 PROCEDURE — 81003 URINALYSIS AUTO W/O SCOPE: CPT | Mod: PBBFAC | Performed by: ADVANCED PRACTICE MIDWIFE

## 2024-10-29 PROCEDURE — 76819 FETAL BIOPHYS PROFIL W/O NST: CPT | Mod: 26,,, | Performed by: OBSTETRICS & GYNECOLOGY

## 2024-10-29 PROCEDURE — 99999 PR PBB SHADOW E&M-EST. PATIENT-LVL III: CPT | Mod: PBBFAC,,, | Performed by: ADVANCED PRACTICE MIDWIFE

## 2024-10-29 PROCEDURE — 0502F SUBSEQUENT PRENATAL CARE: CPT | Mod: ,,, | Performed by: ADVANCED PRACTICE MIDWIFE

## 2024-11-05 ENCOUNTER — HOSPITAL ENCOUNTER (OUTPATIENT)
Dept: OBSTETRICS AND GYNECOLOGY | Facility: CLINIC | Age: 39
Discharge: HOME | End: 2024-11-05
Payer: COMMERCIAL

## 2024-11-05 ENCOUNTER — ROUTINE PRENATAL (OUTPATIENT)
Dept: OBSTETRICS AND GYNECOLOGY | Facility: CLINIC | Age: 39
End: 2024-11-05
Payer: COMMERCIAL

## 2024-11-05 ENCOUNTER — HOSPITAL ENCOUNTER (INPATIENT)
Facility: HOSPITAL | Age: 39
LOS: 3 days | Discharge: HOME | End: 2024-11-08
Attending: STUDENT IN AN ORGANIZED HEALTH CARE EDUCATION/TRAINING PROGRAM | Admitting: OBSTETRICS & GYNECOLOGY
Payer: COMMERCIAL

## 2024-11-05 VITALS
HEART RATE: 98 BPM | DIASTOLIC BLOOD PRESSURE: 88 MMHG | WEIGHT: 174 LBS | BODY MASS INDEX: 29.87 KG/M2 | SYSTOLIC BLOOD PRESSURE: 140 MMHG

## 2024-11-05 DIAGNOSIS — O12.03 GESTATIONAL EDEMA IN THIRD TRIMESTER: ICD-10-CM

## 2024-11-05 DIAGNOSIS — O09.513 PRIMIGRAVIDA OF ADVANCED MATERNAL AGE IN THIRD TRIMESTER: Primary | ICD-10-CM

## 2024-11-05 DIAGNOSIS — O24.410 DIET CONTROLLED GESTATIONAL DIABETES MELLITUS (GDM) IN THIRD TRIMESTER: ICD-10-CM

## 2024-11-05 DIAGNOSIS — Z3A.38 38 WEEKS GESTATION OF PREGNANCY: ICD-10-CM

## 2024-11-05 DIAGNOSIS — O14.93 PRE-ECLAMPSIA IN THIRD TRIMESTER: Primary | ICD-10-CM

## 2024-11-05 DIAGNOSIS — R35.0 URINARY FREQUENCY: ICD-10-CM

## 2024-11-05 DIAGNOSIS — O16.3 HYPERTENSION DURING PREGNANCY IN THIRD TRIMESTER, UNSPECIFIED HYPERTENSION IN PREGNANCY TYPE: ICD-10-CM

## 2024-11-05 DIAGNOSIS — O09.513 PRIMIGRAVIDA OF ADVANCED MATERNAL AGE IN THIRD TRIMESTER: ICD-10-CM

## 2024-11-05 LAB
ABORH RETYPE: NORMAL
ALBUMIN SERPL BCP-MCNC: 2.3 G/DL (ref 3.5–5)
ALBUMIN/GLOB SERPL: 0.6 {RATIO}
ALP SERPL-CCNC: 143 U/L (ref 37–98)
ALT SERPL W P-5'-P-CCNC: 23 U/L (ref 13–56)
ANION GAP SERPL CALCULATED.3IONS-SCNC: 11 MMOL/L (ref 7–16)
AST SERPL W P-5'-P-CCNC: 24 U/L (ref 15–37)
BACTERIA #/AREA URNS HPF: ABNORMAL /HPF
BASOPHILS # BLD AUTO: 0.02 K/UL (ref 0–0.2)
BASOPHILS NFR BLD AUTO: 0.2 % (ref 0–1)
BILIRUB SERPL-MCNC: 0.8 MG/DL (ref ?–1.2)
BILIRUB SERPL-MCNC: NORMAL MG/DL
BILIRUB UR QL STRIP: NEGATIVE
BLOOD URINE, POC: NORMAL
BUN SERPL-MCNC: 11 MG/DL (ref 7–18)
BUN/CREAT SERPL: 18 (ref 6–20)
CALCIUM SERPL-MCNC: 8.8 MG/DL (ref 8.5–10.1)
CHLORIDE SERPL-SCNC: 107 MMOL/L (ref 98–107)
CLARITY UR: ABNORMAL
CLARITY, UA: CLEAR
CO2 SERPL-SCNC: 24 MMOL/L (ref 21–32)
COLOR UR: YELLOW
COLOR, UA: YELLOW
CREAT SERPL-MCNC: 0.62 MG/DL (ref 0.55–1.02)
CREAT UR-MCNC: 28 MG/DL (ref 28–219)
DIFFERENTIAL METHOD BLD: ABNORMAL
EGFR (NO RACE VARIABLE) (RUSH/TITUS): 117 ML/MIN/1.73M2
EOSINOPHIL # BLD AUTO: 0.07 K/UL (ref 0–0.5)
EOSINOPHIL NFR BLD AUTO: 0.7 % (ref 1–4)
ERYTHROCYTE [DISTWIDTH] IN BLOOD BY AUTOMATED COUNT: 13.2 % (ref 11.5–14.5)
GLOBULIN SER-MCNC: 3.7 G/DL (ref 2–4)
GLUCOSE SERPL-MCNC: 109 MG/DL (ref 70–105)
GLUCOSE SERPL-MCNC: 90 MG/DL (ref 70–105)
GLUCOSE SERPL-MCNC: 95 MG/DL (ref 74–106)
GLUCOSE UR QL STRIP: NORMAL
GLUCOSE UR STRIP-MCNC: NORMAL MG/DL
HBV SURFACE AG SERPL QL IA: NORMAL
HCT VFR BLD AUTO: 36.7 % (ref 38–47)
HGB BLD-MCNC: 12.4 G/DL (ref 12–16)
HIV 1+O+2 AB SERPL QL: NORMAL
HYALINE CASTS #/AREA URNS LPF: ABNORMAL /LPF
IMM GRANULOCYTES # BLD AUTO: 0.05 K/UL (ref 0–0.04)
IMM GRANULOCYTES NFR BLD: 0.5 % (ref 0–0.4)
INDIRECT COOMBS: NORMAL
KETONES UR QL STRIP: NORMAL
KETONES UR STRIP-SCNC: NEGATIVE MG/DL
LEUKOCYTE ESTERASE UR QL STRIP: ABNORMAL
LEUKOCYTE ESTERASE URINE, POC: NORMAL
LYMPHOCYTES # BLD AUTO: 1.84 K/UL (ref 1–4.8)
LYMPHOCYTES NFR BLD AUTO: 18.3 % (ref 27–41)
MCH RBC QN AUTO: 27.9 PG (ref 27–31)
MCHC RBC AUTO-ENTMCNC: 33.8 G/DL (ref 32–36)
MCV RBC AUTO: 82.5 FL (ref 80–96)
MONOCYTES # BLD AUTO: 0.71 K/UL (ref 0–0.8)
MONOCYTES NFR BLD AUTO: 7.1 % (ref 2–6)
MPC BLD CALC-MCNC: 11.5 FL (ref 9.4–12.4)
NEUTROPHILS # BLD AUTO: 7.35 K/UL (ref 1.8–7.7)
NEUTROPHILS NFR BLD AUTO: 73.2 % (ref 53–65)
NITRITE UR QL STRIP: NEGATIVE
NITRITE, POC UA: NORMAL
NRBC # BLD AUTO: 0 X10E3/UL
NRBC, AUTO (.00): 0 %
PH UR STRIP: 6.5 PH UNITS
PH, POC UA: 6
PLATELET # BLD AUTO: 205 K/UL (ref 150–400)
POTASSIUM SERPL-SCNC: 4 MMOL/L (ref 3.5–5.1)
PROT SERPL-MCNC: 6 G/DL (ref 6.4–8.2)
PROT UR QL STRIP: NEGATIVE
PROT UR-MCNC: 14 MG/DL (ref 0–11.9)
PROT/CREAT 24H UR-RTO: 0.5
PROTEIN, POC: NORMAL
RBC # BLD AUTO: 4.45 M/UL (ref 4.2–5.4)
RBC # UR STRIP: ABNORMAL /UL
RBC #/AREA URNS HPF: 2 /HPF
RH BLD: NORMAL
SODIUM SERPL-SCNC: 138 MMOL/L (ref 136–145)
SP GR UR STRIP: 1.01
SPECIFIC GRAVITY, POC UA: 1.01
SPECIMEN OUTDATE: NORMAL
SQUAMOUS #/AREA URNS LPF: ABNORMAL /HPF
SYPHILIS AB INTERPRETATION: NORMAL
URATE SERPL-MCNC: 4 MG/DL (ref 2.6–6)
UROBILINOGEN UR STRIP-ACNC: NORMAL MG/DL
UROBILINOGEN, POC UA: NORMAL
WBC # BLD AUTO: 10.04 K/UL (ref 4.5–11)
WBC #/AREA URNS HPF: 17 /HPF

## 2024-11-05 PROCEDURE — 87340 HEPATITIS B SURFACE AG IA: CPT | Performed by: ADVANCED PRACTICE MIDWIFE

## 2024-11-05 PROCEDURE — 63600175 PHARM REV CODE 636 W HCPCS: Performed by: ADVANCED PRACTICE MIDWIFE

## 2024-11-05 PROCEDURE — 99213 OFFICE O/P EST LOW 20 MIN: CPT | Mod: PBBFAC | Performed by: ADVANCED PRACTICE MIDWIFE

## 2024-11-05 PROCEDURE — 99999 PR PBB SHADOW E&M-EST. PATIENT-LVL III: CPT | Mod: PBBFAC,,, | Performed by: ADVANCED PRACTICE MIDWIFE

## 2024-11-05 PROCEDURE — 85025 COMPLETE CBC W/AUTO DIFF WBC: CPT | Performed by: ADVANCED PRACTICE MIDWIFE

## 2024-11-05 PROCEDURE — 84550 ASSAY OF BLOOD/URIC ACID: CPT | Performed by: ADVANCED PRACTICE MIDWIFE

## 2024-11-05 PROCEDURE — 99999PBSHW POCT URINALYSIS W/O SCOPE: Mod: PBBFAC,,,

## 2024-11-05 PROCEDURE — 36415 COLL VENOUS BLD VENIPUNCTURE: CPT | Performed by: ADVANCED PRACTICE MIDWIFE

## 2024-11-05 PROCEDURE — 0502F SUBSEQUENT PRENATAL CARE: CPT | Mod: ,,, | Performed by: ADVANCED PRACTICE MIDWIFE

## 2024-11-05 PROCEDURE — 80053 COMPREHEN METABOLIC PANEL: CPT | Performed by: ADVANCED PRACTICE MIDWIFE

## 2024-11-05 PROCEDURE — 86850 RBC ANTIBODY SCREEN: CPT | Performed by: ADVANCED PRACTICE MIDWIFE

## 2024-11-05 PROCEDURE — 82962 GLUCOSE BLOOD TEST: CPT

## 2024-11-05 PROCEDURE — 76819 FETAL BIOPHYS PROFIL W/O NST: CPT | Mod: 26,,, | Performed by: OBSTETRICS & GYNECOLOGY

## 2024-11-05 PROCEDURE — 25000003 PHARM REV CODE 250: Performed by: ADVANCED PRACTICE MIDWIFE

## 2024-11-05 PROCEDURE — 11000001 HC ACUTE MED/SURG PRIVATE ROOM

## 2024-11-05 PROCEDURE — 81003 URINALYSIS AUTO W/O SCOPE: CPT | Mod: PBBFAC | Performed by: ADVANCED PRACTICE MIDWIFE

## 2024-11-05 PROCEDURE — 87389 HIV-1 AG W/HIV-1&-2 AB AG IA: CPT | Performed by: ADVANCED PRACTICE MIDWIFE

## 2024-11-05 PROCEDURE — 81003 URINALYSIS AUTO W/O SCOPE: CPT | Performed by: ADVANCED PRACTICE MIDWIFE

## 2024-11-05 PROCEDURE — 87086 URINE CULTURE/COLONY COUNT: CPT | Performed by: ADVANCED PRACTICE MIDWIFE

## 2024-11-05 PROCEDURE — 86780 TREPONEMA PALLIDUM: CPT | Performed by: ADVANCED PRACTICE MIDWIFE

## 2024-11-05 PROCEDURE — 3E0P7VZ INTRODUCTION OF HORMONE INTO FEMALE REPRODUCTIVE, VIA NATURAL OR ARTIFICIAL OPENING: ICD-10-PCS | Performed by: OBSTETRICS & GYNECOLOGY

## 2024-11-05 PROCEDURE — 84156 ASSAY OF PROTEIN URINE: CPT | Performed by: ADVANCED PRACTICE MIDWIFE

## 2024-11-05 PROCEDURE — 36415 COLL VENOUS BLD VENIPUNCTURE: CPT | Performed by: STUDENT IN AN ORGANIZED HEALTH CARE EDUCATION/TRAINING PROGRAM

## 2024-11-05 RX ORDER — MISOPROSTOL 200 UG/1
800 TABLET ORAL ONCE AS NEEDED
Status: DISCONTINUED | OUTPATIENT
Start: 2024-11-05 | End: 2024-11-08 | Stop reason: HOSPADM

## 2024-11-05 RX ORDER — SODIUM CHLORIDE 9 MG/ML
INJECTION, SOLUTION INTRAVENOUS
Status: DISCONTINUED | OUTPATIENT
Start: 2024-11-05 | End: 2024-11-08 | Stop reason: HOSPADM

## 2024-11-05 RX ORDER — TRANEXAMIC ACID 10 MG/ML
1000 INJECTION, SOLUTION INTRAVENOUS EVERY 30 MIN PRN
Status: CANCELLED | OUTPATIENT
Start: 2024-11-05

## 2024-11-05 RX ORDER — LIDOCAINE HYDROCHLORIDE 10 MG/ML
10 INJECTION, SOLUTION INFILTRATION; PERINEURAL ONCE AS NEEDED
Status: CANCELLED | OUTPATIENT
Start: 2024-11-05 | End: 2036-04-03

## 2024-11-05 RX ORDER — CALCIUM CARBONATE 200(500)MG
500 TABLET,CHEWABLE ORAL 3 TIMES DAILY PRN
Status: DISCONTINUED | OUTPATIENT
Start: 2024-11-05 | End: 2024-11-08 | Stop reason: HOSPADM

## 2024-11-05 RX ORDER — OXYTOCIN-SODIUM CHLORIDE 0.9% IV SOLN 30 UNIT/500ML 30-0.9/5 UT/ML-%
30 SOLUTION INTRAVENOUS ONCE AS NEEDED
Status: CANCELLED | OUTPATIENT
Start: 2024-11-05 | End: 2036-04-03

## 2024-11-05 RX ORDER — MISOPROSTOL 100 MCG
25 TABLET ORAL EVERY 4 HOURS PRN
Status: DISCONTINUED | OUTPATIENT
Start: 2024-11-05 | End: 2024-11-08 | Stop reason: HOSPADM

## 2024-11-05 RX ORDER — METHYLERGONOVINE MALEATE 0.2 MG/ML
200 INJECTION INTRAVENOUS ONCE AS NEEDED
Status: CANCELLED | OUTPATIENT
Start: 2024-11-05 | End: 2036-04-03

## 2024-11-05 RX ORDER — MISOPROSTOL 200 UG/1
800 TABLET ORAL ONCE AS NEEDED
Status: CANCELLED | OUTPATIENT
Start: 2024-11-05

## 2024-11-05 RX ORDER — CARBOPROST TROMETHAMINE 250 UG/ML
250 INJECTION, SOLUTION INTRAMUSCULAR
Status: CANCELLED | OUTPATIENT
Start: 2024-11-05

## 2024-11-05 RX ORDER — SODIUM CHLORIDE, SODIUM LACTATE, POTASSIUM CHLORIDE, CALCIUM CHLORIDE 600; 310; 30; 20 MG/100ML; MG/100ML; MG/100ML; MG/100ML
INJECTION, SOLUTION INTRAVENOUS CONTINUOUS
Status: DISCONTINUED | OUTPATIENT
Start: 2024-11-05 | End: 2024-11-08 | Stop reason: HOSPADM

## 2024-11-05 RX ORDER — BUTORPHANOL TARTRATE 2 MG/ML
2 INJECTION INTRAMUSCULAR; INTRAVENOUS
Status: DISCONTINUED | OUTPATIENT
Start: 2024-11-05 | End: 2024-11-06

## 2024-11-05 RX ORDER — SIMETHICONE 80 MG
1 TABLET,CHEWABLE ORAL 4 TIMES DAILY PRN
Status: CANCELLED | OUTPATIENT
Start: 2024-11-05

## 2024-11-05 RX ORDER — METHYLERGONOVINE MALEATE 0.2 MG/ML
200 INJECTION INTRAVENOUS ONCE AS NEEDED
Status: DISCONTINUED | OUTPATIENT
Start: 2024-11-05 | End: 2024-11-08 | Stop reason: HOSPADM

## 2024-11-05 RX ORDER — MISOPROSTOL 100 MCG
25 TABLET ORAL EVERY 4 HOURS PRN
Status: CANCELLED | OUTPATIENT
Start: 2024-11-05

## 2024-11-05 RX ORDER — OXYTOCIN-SODIUM CHLORIDE 0.9% IV SOLN 30 UNIT/500ML 30-0.9/5 UT/ML-%
95 SOLUTION INTRAVENOUS ONCE AS NEEDED
Status: DISCONTINUED | OUTPATIENT
Start: 2024-11-05 | End: 2024-11-06

## 2024-11-05 RX ORDER — CARBOPROST TROMETHAMINE 250 UG/ML
250 INJECTION, SOLUTION INTRAMUSCULAR
Status: DISCONTINUED | OUTPATIENT
Start: 2024-11-05 | End: 2024-11-08 | Stop reason: HOSPADM

## 2024-11-05 RX ORDER — TRANEXAMIC ACID 10 MG/ML
1000 INJECTION, SOLUTION INTRAVENOUS EVERY 30 MIN PRN
Status: DISCONTINUED | OUTPATIENT
Start: 2024-11-05 | End: 2024-11-08 | Stop reason: HOSPADM

## 2024-11-05 RX ORDER — MISOPROSTOL 200 UG/1
800 TABLET ORAL ONCE AS NEEDED
Status: CANCELLED | OUTPATIENT
Start: 2024-11-05 | End: 2036-04-03

## 2024-11-05 RX ORDER — BUTORPHANOL TARTRATE 2 MG/ML
1 INJECTION INTRAMUSCULAR; INTRAVENOUS
Status: DISCONTINUED | OUTPATIENT
Start: 2024-11-05 | End: 2024-11-06

## 2024-11-05 RX ORDER — OXYTOCIN-SODIUM CHLORIDE 0.9% IV SOLN 30 UNIT/500ML 30-0.9/5 UT/ML-%
30 SOLUTION INTRAVENOUS ONCE AS NEEDED
Status: DISCONTINUED | OUTPATIENT
Start: 2024-11-05 | End: 2024-11-06

## 2024-11-05 RX ORDER — LABETALOL HYDROCHLORIDE 5 MG/ML
20 INJECTION, SOLUTION INTRAVENOUS ONCE AS NEEDED
Status: COMPLETED | OUTPATIENT
Start: 2024-11-05 | End: 2024-11-05

## 2024-11-05 RX ORDER — BUTORPHANOL TARTRATE 1 MG/ML
1 INJECTION INTRAMUSCULAR; INTRAVENOUS
Status: CANCELLED | OUTPATIENT
Start: 2024-11-05

## 2024-11-05 RX ORDER — HYDRALAZINE HYDROCHLORIDE 20 MG/ML
10 INJECTION INTRAMUSCULAR; INTRAVENOUS ONCE AS NEEDED
Status: DISCONTINUED | OUTPATIENT
Start: 2024-11-05 | End: 2024-11-08 | Stop reason: HOSPADM

## 2024-11-05 RX ORDER — MAGNESIUM SULFATE HEPTAHYDRATE 40 MG/ML
4 INJECTION, SOLUTION INTRAVENOUS ONCE
Status: DISCONTINUED | OUTPATIENT
Start: 2024-11-05 | End: 2024-11-08 | Stop reason: HOSPADM

## 2024-11-05 RX ORDER — CALCIUM CARBONATE 200(500)MG
500 TABLET,CHEWABLE ORAL 3 TIMES DAILY PRN
Status: CANCELLED | OUTPATIENT
Start: 2024-11-05

## 2024-11-05 RX ORDER — OXYTOCIN 10 [USP'U]/ML
10 INJECTION, SOLUTION INTRAMUSCULAR; INTRAVENOUS ONCE AS NEEDED
Status: DISCONTINUED | OUTPATIENT
Start: 2024-11-05 | End: 2024-11-08 | Stop reason: HOSPADM

## 2024-11-05 RX ORDER — OXYTOCIN-SODIUM CHLORIDE 0.9% IV SOLN 30 UNIT/500ML 30-0.9/5 UT/ML-%
10 SOLUTION INTRAVENOUS ONCE AS NEEDED
Status: CANCELLED | OUTPATIENT
Start: 2024-11-05 | End: 2036-04-03

## 2024-11-05 RX ORDER — DIPHENOXYLATE HYDROCHLORIDE AND ATROPINE SULFATE 2.5; .025 MG/1; MG/1
2 TABLET ORAL EVERY 6 HOURS PRN
Status: CANCELLED | OUTPATIENT
Start: 2024-11-05

## 2024-11-05 RX ORDER — CALCIUM GLUCONATE 98 MG/ML
1 INJECTION, SOLUTION INTRAVENOUS
Status: DISCONTINUED | OUTPATIENT
Start: 2024-11-05 | End: 2024-11-08 | Stop reason: HOSPADM

## 2024-11-05 RX ORDER — SODIUM CHLORIDE, SODIUM LACTATE, POTASSIUM CHLORIDE, CALCIUM CHLORIDE 600; 310; 30; 20 MG/100ML; MG/100ML; MG/100ML; MG/100ML
INJECTION, SOLUTION INTRAVENOUS CONTINUOUS
Status: CANCELLED | OUTPATIENT
Start: 2024-11-05

## 2024-11-05 RX ORDER — SIMETHICONE 80 MG
1 TABLET,CHEWABLE ORAL 4 TIMES DAILY PRN
Status: DISCONTINUED | OUTPATIENT
Start: 2024-11-05 | End: 2024-11-08 | Stop reason: HOSPADM

## 2024-11-05 RX ORDER — OXYTOCIN-SODIUM CHLORIDE 0.9% IV SOLN 30 UNIT/500ML 30-0.9/5 UT/ML-%
95 SOLUTION INTRAVENOUS CONTINUOUS PRN
Status: DISCONTINUED | OUTPATIENT
Start: 2024-11-05 | End: 2024-11-06

## 2024-11-05 RX ORDER — TERBUTALINE SULFATE 1 MG/ML
0.25 INJECTION SUBCUTANEOUS
Status: CANCELLED | OUTPATIENT
Start: 2024-11-05

## 2024-11-05 RX ORDER — OXYTOCIN/0.9 % SODIUM CHLORIDE 15/250 ML
95 PLASTIC BAG, INJECTION (ML) INTRAVENOUS CONTINUOUS PRN
Status: CANCELLED | OUTPATIENT
Start: 2024-11-05

## 2024-11-05 RX ORDER — LIDOCAINE HYDROCHLORIDE 10 MG/ML
10 INJECTION, SOLUTION INFILTRATION; PERINEURAL ONCE AS NEEDED
Status: DISCONTINUED | OUTPATIENT
Start: 2024-11-05 | End: 2024-11-08 | Stop reason: HOSPADM

## 2024-11-05 RX ORDER — TERBUTALINE SULFATE 1 MG/ML
0.25 INJECTION SUBCUTANEOUS
Status: DISCONTINUED | OUTPATIENT
Start: 2024-11-05 | End: 2024-11-08 | Stop reason: HOSPADM

## 2024-11-05 RX ORDER — MAGNESIUM SULFATE HEPTAHYDRATE 40 MG/ML
2 INJECTION, SOLUTION INTRAVENOUS CONTINUOUS
Status: DISCONTINUED | OUTPATIENT
Start: 2024-11-05 | End: 2024-11-08 | Stop reason: HOSPADM

## 2024-11-05 RX ORDER — SODIUM CHLORIDE 9 MG/ML
INJECTION, SOLUTION INTRAVENOUS
Status: CANCELLED | OUTPATIENT
Start: 2024-11-05

## 2024-11-05 RX ORDER — ONDANSETRON 4 MG/1
8 TABLET, ORALLY DISINTEGRATING ORAL EVERY 8 HOURS PRN
Status: CANCELLED | OUTPATIENT
Start: 2024-11-05

## 2024-11-05 RX ORDER — LABETALOL HYDROCHLORIDE 5 MG/ML
80 INJECTION, SOLUTION INTRAVENOUS ONCE AS NEEDED
Status: COMPLETED | OUTPATIENT
Start: 2024-11-05 | End: 2024-11-06

## 2024-11-05 RX ORDER — MUPIROCIN 20 MG/G
OINTMENT TOPICAL
Status: DISCONTINUED | OUTPATIENT
Start: 2024-11-05 | End: 2024-11-08 | Stop reason: HOSPADM

## 2024-11-05 RX ORDER — MUPIROCIN 20 MG/G
OINTMENT TOPICAL
Status: CANCELLED | OUTPATIENT
Start: 2024-11-05

## 2024-11-05 RX ORDER — OXYTOCIN-SODIUM CHLORIDE 0.9% IV SOLN 30 UNIT/500ML 30-0.9/5 UT/ML-%
10 SOLUTION INTRAVENOUS ONCE AS NEEDED
Status: DISCONTINUED | OUTPATIENT
Start: 2024-11-05 | End: 2024-11-06

## 2024-11-05 RX ORDER — OXYTOCIN/0.9 % SODIUM CHLORIDE 15/250 ML
95 PLASTIC BAG, INJECTION (ML) INTRAVENOUS ONCE AS NEEDED
Status: CANCELLED | OUTPATIENT
Start: 2024-11-05 | End: 2036-04-03

## 2024-11-05 RX ORDER — DIPHENOXYLATE HYDROCHLORIDE AND ATROPINE SULFATE 2.5; .025 MG/1; MG/1
2 TABLET ORAL EVERY 6 HOURS PRN
Status: DISCONTINUED | OUTPATIENT
Start: 2024-11-05 | End: 2024-11-08 | Stop reason: HOSPADM

## 2024-11-05 RX ORDER — BUTORPHANOL TARTRATE 2 MG/ML
2 INJECTION INTRAMUSCULAR; INTRAVENOUS
Status: CANCELLED | OUTPATIENT
Start: 2024-11-05

## 2024-11-05 RX ORDER — ONDANSETRON 4 MG/1
8 TABLET, ORALLY DISINTEGRATING ORAL EVERY 8 HOURS PRN
Status: DISCONTINUED | OUTPATIENT
Start: 2024-11-05 | End: 2024-11-08 | Stop reason: HOSPADM

## 2024-11-05 RX ORDER — ZOLPIDEM TARTRATE 5 MG/1
5 TABLET ORAL NIGHTLY PRN
Status: DISCONTINUED | OUTPATIENT
Start: 2024-11-05 | End: 2024-11-06

## 2024-11-05 RX ORDER — OXYTOCIN 10 [USP'U]/ML
10 INJECTION, SOLUTION INTRAMUSCULAR; INTRAVENOUS ONCE AS NEEDED
Status: CANCELLED | OUTPATIENT
Start: 2024-11-05 | End: 2036-04-03

## 2024-11-05 RX ORDER — LABETALOL HYDROCHLORIDE 5 MG/ML
40 INJECTION, SOLUTION INTRAVENOUS ONCE AS NEEDED
Status: COMPLETED | OUTPATIENT
Start: 2024-11-05 | End: 2024-11-05

## 2024-11-05 RX ADMIN — SODIUM CHLORIDE, POTASSIUM CHLORIDE, SODIUM LACTATE AND CALCIUM CHLORIDE: 600; 310; 30; 20 INJECTION, SOLUTION INTRAVENOUS at 09:11

## 2024-11-05 RX ADMIN — LABETALOL HYDROCHLORIDE 40 MG: 5 INJECTION, SOLUTION INTRAVENOUS at 12:11

## 2024-11-05 RX ADMIN — MISOPROSTOL 25 MCG: 100 TABLET ORAL at 12:11

## 2024-11-05 RX ADMIN — MISOPROSTOL 25 MCG: 100 TABLET ORAL at 04:11

## 2024-11-05 RX ADMIN — LABETALOL HYDROCHLORIDE 20 MG: 5 INJECTION, SOLUTION INTRAVENOUS at 11:11

## 2024-11-05 RX ADMIN — MISOPROSTOL 25 MCG: 100 TABLET ORAL at 08:11

## 2024-11-05 NOTE — PROGRESS NOTES
Return OB Visit    39 y.o. female  at 38w1d   She c/o juanito jackson, occasional headaches. Denies visual changes.  Has been taking BP at home and states she has had Bps as high as 150/100s.   Reports good fetal movement or fluttering. Denies any vaginal bleeding, leakage of fluid, cramping, , or pressure.   Total weight gain/weight loss in pregnancy: 15.4 kg (34 lb)     BP Recheck 150/92    Vitals  BP: (!) 140/88  Pulse: 98  Weight: 78.9 kg (174 lb)  Prenatal  Fetal Heart Rate: 138  Movement: Present  Edema  LLE Edema: Mild pitting, slight indentation  RLE Edema: Mild pitting, slight indentation  Facial: None  Additional Edema?: No  Cervical Exam  Dilation: 2  Effacement (%): 50  Station: -3  Presentation: Vertex  Station (Labor Curve): 8 cm  Dilation/Effacement/Station  Dilation: 2  Effacement (%): 50  Station: -3    Prenatal Labs:  Lab Results   Component Value Date    HGB 13.0 10/29/2024    HCT 40.2 10/29/2024     10/29/2024    HEPBSAG Negative 2024    [id number] Non-Reactive 2024    RPR Non-Reactive 2024    LABCHLA Negative 2024    LABNGO Negative 2024    LABURIN No growth at 48 hours 2024       A: 38w1d           ICD-10-CM ICD-9-CM    1. Primigravida of advanced maternal age in third trimester  O09.513 659.53       2. Diet controlled gestational diabetes mellitus (GDM) in third trimester  O24.410 648.83       3. 38 weeks gestation of pregnancy  Z3A.38 V22.2 POCT URINALYSIS W/O SCOPE      4. Gestational edema in third trimester  O12.03 646.13       5. Urinary frequency  R35.0 788.41 POCT URINALYSIS W/O SCOPE      US today DEEPTHI: 17.7cm BPP 8/ Vertex     No pregnancy checklist tasks were completed during this visit, and no tasks are pending completion.    -Benefits of breastfeeding   -Rooming In and Skin to Skin    P: Bleeding, daily fetal kick counts, and  labor/labor precautions discussed.  Recommend admit to L&D for IOL due to suspected  Pre-Eclampsia  Discussed R/B and POC with patient and she is in agreement to plan  Will start cytotec on admission for cervical ripening.  Orders placed and spoke with Ellyn in L&D regarding pt status  Dr Amaya notified of pt status and in agreement with POC    Questions answered to desired level of satisfaction  Verbalized understanding to all information and instructions provided.  Admit    Clarisa Ibarra CNM, WHMARTÍNEZ-BC

## 2024-11-06 ENCOUNTER — ANESTHESIA EVENT (OUTPATIENT)
Dept: OBSTETRICS AND GYNECOLOGY | Facility: HOSPITAL | Age: 39
End: 2024-11-06
Payer: COMMERCIAL

## 2024-11-06 ENCOUNTER — ANESTHESIA (OUTPATIENT)
Dept: OBSTETRICS AND GYNECOLOGY | Facility: HOSPITAL | Age: 39
End: 2024-11-06
Payer: COMMERCIAL

## 2024-11-06 PROBLEM — O24.410 DIET CONTROLLED GESTATIONAL DIABETES MELLITUS (GDM) IN THIRD TRIMESTER: Status: ACTIVE | Noted: 2024-11-06

## 2024-11-06 PROBLEM — O14.93 PRE-ECLAMPSIA IN THIRD TRIMESTER: Status: ACTIVE | Noted: 2024-11-06

## 2024-11-06 PROBLEM — Z3A.38 38 WEEKS GESTATION OF PREGNANCY: Status: ACTIVE | Noted: 2024-11-06

## 2024-11-06 PROBLEM — O09.523 MULTIGRAVIDA OF ADVANCED MATERNAL AGE IN THIRD TRIMESTER: Status: ACTIVE | Noted: 2024-11-06

## 2024-11-06 LAB
GLUCOSE SERPL-MCNC: 105 MG/DL (ref 70–105)
HCO3 UR-SCNC: 22.3 MMOL/L (ref 24–28)
PCO2 BLDA: 52 MMHG (ref 41–51)
PH SMN: 7.24 [PH] (ref 7.32–7.42)
PO2 BLDA: 34 MMHG
POC BASE EXCESS: -5.5 MMOL/L (ref -2–2)
POC SATURATED O2: 62.4 %

## 2024-11-06 PROCEDURE — 72200006 HC VAGINAL DELIVERY LEVEL III

## 2024-11-06 PROCEDURE — 0KQM0ZZ REPAIR PERINEUM MUSCLE, OPEN APPROACH: ICD-10-PCS | Performed by: OBSTETRICS & GYNECOLOGY

## 2024-11-06 PROCEDURE — 3E033VJ INTRODUCTION OF OTHER HORMONE INTO PERIPHERAL VEIN, PERCUTANEOUS APPROACH: ICD-10-PCS | Performed by: OBSTETRICS & GYNECOLOGY

## 2024-11-06 PROCEDURE — 82962 GLUCOSE BLOOD TEST: CPT

## 2024-11-06 PROCEDURE — 25000003 PHARM REV CODE 250: Performed by: STUDENT IN AN ORGANIZED HEALTH CARE EDUCATION/TRAINING PROGRAM

## 2024-11-06 PROCEDURE — 72100003 HC LABOR CARE, EA. ADDL. 8 HRS

## 2024-11-06 PROCEDURE — C1751 CATH, INF, PER/CENT/MIDLINE: HCPCS | Performed by: ANESTHESIOLOGY

## 2024-11-06 PROCEDURE — 25000003 PHARM REV CODE 250: Performed by: ANESTHESIOLOGY

## 2024-11-06 PROCEDURE — 63600175 PHARM REV CODE 636 W HCPCS: Performed by: ANESTHESIOLOGY

## 2024-11-06 PROCEDURE — 63600175 PHARM REV CODE 636 W HCPCS: Performed by: ADVANCED PRACTICE MIDWIFE

## 2024-11-06 PROCEDURE — 27000980 HC MATTRESS, OVERLAY WAFFLE

## 2024-11-06 PROCEDURE — 72100002 HC LABOR CARE, 1ST 8 HOURS

## 2024-11-06 PROCEDURE — 25000003 PHARM REV CODE 250: Performed by: ADVANCED PRACTICE MIDWIFE

## 2024-11-06 PROCEDURE — 51702 INSERT TEMP BLADDER CATH: CPT

## 2024-11-06 PROCEDURE — 82803 BLOOD GASES ANY COMBINATION: CPT

## 2024-11-06 PROCEDURE — 63600175 PHARM REV CODE 636 W HCPCS: Performed by: STUDENT IN AN ORGANIZED HEALTH CARE EDUCATION/TRAINING PROGRAM

## 2024-11-06 PROCEDURE — 62326 NJX INTERLAMINAR LMBR/SAC: CPT | Mod: AA | Performed by: ANESTHESIOLOGY

## 2024-11-06 PROCEDURE — 11000001 HC ACUTE MED/SURG PRIVATE ROOM

## 2024-11-06 PROCEDURE — 59400 OBSTETRICAL CARE: CPT | Mod: ,,, | Performed by: OBSTETRICS & GYNECOLOGY

## 2024-11-06 RX ORDER — HYDROCORTISONE 25 MG/G
CREAM TOPICAL 3 TIMES DAILY PRN
Status: DISCONTINUED | OUTPATIENT
Start: 2024-11-06 | End: 2024-11-08 | Stop reason: HOSPADM

## 2024-11-06 RX ORDER — CARBOPROST TROMETHAMINE 250 UG/ML
250 INJECTION, SOLUTION INTRAMUSCULAR
Status: DISCONTINUED | OUTPATIENT
Start: 2024-11-06 | End: 2024-11-08 | Stop reason: HOSPADM

## 2024-11-06 RX ORDER — DIPHENHYDRAMINE HYDROCHLORIDE 50 MG/ML
25 INJECTION INTRAMUSCULAR; INTRAVENOUS EVERY 4 HOURS PRN
Status: DISCONTINUED | OUTPATIENT
Start: 2024-11-06 | End: 2024-11-08 | Stop reason: HOSPADM

## 2024-11-06 RX ORDER — OXYCODONE HYDROCHLORIDE 5 MG/1
10 TABLET ORAL EVERY 4 HOURS PRN
OUTPATIENT
Start: 2024-11-06 | End: 2024-11-07

## 2024-11-06 RX ORDER — FENTANYL/ROPIVACAINE/NS/PF 2MCG/ML-.2
PLASTIC BAG, INJECTION (ML) INJECTION CONTINUOUS
Status: DISCONTINUED | OUTPATIENT
Start: 2024-11-06 | End: 2024-11-06

## 2024-11-06 RX ORDER — OXYCODONE HYDROCHLORIDE 5 MG/1
5 TABLET ORAL EVERY 4 HOURS PRN
OUTPATIENT
Start: 2024-11-06 | End: 2024-11-07

## 2024-11-06 RX ORDER — HYDROCODONE BITARTRATE AND ACETAMINOPHEN 5; 325 MG/1; MG/1
1 TABLET ORAL EVERY 4 HOURS PRN
Status: DISCONTINUED | OUTPATIENT
Start: 2024-11-06 | End: 2024-11-08 | Stop reason: HOSPADM

## 2024-11-06 RX ORDER — OXYTOCIN-SODIUM CHLORIDE 0.9% IV SOLN 30 UNIT/500ML 30-0.9/5 UT/ML-%
15 SOLUTION INTRAVENOUS ONCE AS NEEDED
Status: DISCONTINUED | OUTPATIENT
Start: 2024-11-06 | End: 2024-11-06

## 2024-11-06 RX ORDER — MISOPROSTOL 200 UG/1
800 TABLET ORAL ONCE AS NEEDED
Status: DISCONTINUED | OUTPATIENT
Start: 2024-11-06 | End: 2024-11-08 | Stop reason: HOSPADM

## 2024-11-06 RX ORDER — OXYTOCIN/0.9 % SODIUM CHLORIDE 15/250 ML
0-32 PLASTIC BAG, INJECTION (ML) INTRAVENOUS CONTINUOUS
Status: DISCONTINUED | OUTPATIENT
Start: 2024-11-06 | End: 2024-11-08 | Stop reason: HOSPADM

## 2024-11-06 RX ORDER — OXYTOCIN/0.9 % SODIUM CHLORIDE 15/250 ML
95 PLASTIC BAG, INJECTION (ML) INTRAVENOUS ONCE AS NEEDED
Status: DISCONTINUED | OUTPATIENT
Start: 2024-11-06 | End: 2024-11-06

## 2024-11-06 RX ORDER — SIMETHICONE 80 MG
1 TABLET,CHEWABLE ORAL EVERY 6 HOURS PRN
Status: DISCONTINUED | OUTPATIENT
Start: 2024-11-06 | End: 2024-11-08 | Stop reason: HOSPADM

## 2024-11-06 RX ORDER — DOCUSATE SODIUM 100 MG/1
200 CAPSULE, LIQUID FILLED ORAL 2 TIMES DAILY PRN
Status: DISCONTINUED | OUTPATIENT
Start: 2024-11-06 | End: 2024-11-08 | Stop reason: HOSPADM

## 2024-11-06 RX ORDER — HYDROCODONE BITARTRATE AND ACETAMINOPHEN 7.5; 325 MG/1; MG/1
1 TABLET ORAL EVERY 4 HOURS PRN
Status: DISCONTINUED | OUTPATIENT
Start: 2024-11-06 | End: 2024-11-08 | Stop reason: HOSPADM

## 2024-11-06 RX ORDER — EPHEDRINE SULFATE 50 MG/ML
10 INJECTION, SOLUTION INTRAVENOUS ONCE
Status: COMPLETED | OUTPATIENT
Start: 2024-11-06 | End: 2024-11-06

## 2024-11-06 RX ORDER — OXYTOCIN-SODIUM CHLORIDE 0.9% IV SOLN 30 UNIT/500ML 30-0.9/5 UT/ML-%
30 SOLUTION INTRAVENOUS ONCE AS NEEDED
Status: DISCONTINUED | OUTPATIENT
Start: 2024-11-06 | End: 2024-11-08 | Stop reason: HOSPADM

## 2024-11-06 RX ORDER — DIPHENOXYLATE HYDROCHLORIDE AND ATROPINE SULFATE 2.5; .025 MG/1; MG/1
2 TABLET ORAL EVERY 6 HOURS PRN
Status: DISCONTINUED | OUTPATIENT
Start: 2024-11-06 | End: 2024-11-08 | Stop reason: HOSPADM

## 2024-11-06 RX ORDER — ACETAMINOPHEN 325 MG/1
650 TABLET ORAL EVERY 6 HOURS SCHEDULED
Status: DISCONTINUED | OUTPATIENT
Start: 2024-11-06 | End: 2024-11-06

## 2024-11-06 RX ORDER — LIDOCAINE HYDROCHLORIDE 20 MG/ML
10 INJECTION, SOLUTION EPIDURAL; INFILTRATION; INTRACAUDAL; PERINEURAL EVERY 5 MIN PRN
Status: DISCONTINUED | OUTPATIENT
Start: 2024-11-06 | End: 2024-11-06

## 2024-11-06 RX ORDER — OXYTOCIN 10 [USP'U]/ML
10 INJECTION, SOLUTION INTRAMUSCULAR; INTRAVENOUS ONCE AS NEEDED
Status: DISCONTINUED | OUTPATIENT
Start: 2024-11-06 | End: 2024-11-08 | Stop reason: HOSPADM

## 2024-11-06 RX ORDER — DIPHENHYDRAMINE HCL 25 MG
25 CAPSULE ORAL EVERY 4 HOURS PRN
Status: DISCONTINUED | OUTPATIENT
Start: 2024-11-06 | End: 2024-11-08 | Stop reason: HOSPADM

## 2024-11-06 RX ORDER — ONDANSETRON HYDROCHLORIDE 2 MG/ML
4 INJECTION, SOLUTION INTRAVENOUS EVERY 6 HOURS PRN
Status: DISCONTINUED | OUTPATIENT
Start: 2024-11-06 | End: 2024-11-06

## 2024-11-06 RX ORDER — TRANEXAMIC ACID 10 MG/ML
1000 INJECTION, SOLUTION INTRAVENOUS EVERY 30 MIN PRN
Status: DISCONTINUED | OUTPATIENT
Start: 2024-11-06 | End: 2024-11-08 | Stop reason: HOSPADM

## 2024-11-06 RX ORDER — OXYTOCIN/0.9 % SODIUM CHLORIDE 15/250 ML
95 PLASTIC BAG, INJECTION (ML) INTRAVENOUS ONCE AS NEEDED
Status: DISCONTINUED | OUTPATIENT
Start: 2024-11-06 | End: 2024-11-08 | Stop reason: HOSPADM

## 2024-11-06 RX ORDER — IBUPROFEN 800 MG/1
800 TABLET ORAL EVERY 6 HOURS PRN
Status: DISCONTINUED | OUTPATIENT
Start: 2024-11-06 | End: 2024-11-08 | Stop reason: HOSPADM

## 2024-11-06 RX ORDER — ACETAMINOPHEN 325 MG/1
650 TABLET ORAL EVERY 6 HOURS
OUTPATIENT
Start: 2024-11-06 | End: 2024-11-07

## 2024-11-06 RX ORDER — SODIUM CHLORIDE 0.9 % (FLUSH) 0.9 %
10 SYRINGE (ML) INJECTION
Status: DISCONTINUED | OUTPATIENT
Start: 2024-11-06 | End: 2024-11-08 | Stop reason: HOSPADM

## 2024-11-06 RX ORDER — PRENATAL WITH FERROUS FUM AND FOLIC ACID 3080; 920; 120; 400; 22; 1.84; 3; 20; 10; 1; 12; 200; 27; 25; 2 [IU]/1; [IU]/1; MG/1; [IU]/1; MG/1; MG/1; MG/1; MG/1; MG/1; MG/1; UG/1; MG/1; MG/1; MG/1; MG/1
1 TABLET ORAL DAILY
Status: DISCONTINUED | OUTPATIENT
Start: 2024-11-06 | End: 2024-11-08 | Stop reason: HOSPADM

## 2024-11-06 RX ORDER — ONDANSETRON 4 MG/1
8 TABLET, ORALLY DISINTEGRATING ORAL EVERY 8 HOURS PRN
Status: DISCONTINUED | OUTPATIENT
Start: 2024-11-06 | End: 2024-11-08 | Stop reason: HOSPADM

## 2024-11-06 RX ORDER — METHYLERGONOVINE MALEATE 0.2 MG/ML
200 INJECTION INTRAVENOUS ONCE AS NEEDED
Status: DISCONTINUED | OUTPATIENT
Start: 2024-11-06 | End: 2024-11-08 | Stop reason: HOSPADM

## 2024-11-06 RX ORDER — KETOROLAC TROMETHAMINE 30 MG/ML
30 INJECTION, SOLUTION INTRAMUSCULAR; INTRAVENOUS EVERY 6 HOURS
OUTPATIENT
Start: 2024-11-06 | End: 2024-11-07

## 2024-11-06 RX ORDER — LIDOCAINE HYDROCHLORIDE 20 MG/ML
INJECTION, SOLUTION EPIDURAL; INFILTRATION; INTRACAUDAL; PERINEURAL
Status: COMPLETED | OUTPATIENT
Start: 2024-11-06 | End: 2024-11-06

## 2024-11-06 RX ORDER — ONDANSETRON HYDROCHLORIDE 2 MG/ML
4 INJECTION, SOLUTION INTRAVENOUS EVERY 6 HOURS PRN
OUTPATIENT
Start: 2024-11-06 | End: 2024-11-07

## 2024-11-06 RX ORDER — OXYTOCIN/0.9 % SODIUM CHLORIDE 15/250 ML
95 PLASTIC BAG, INJECTION (ML) INTRAVENOUS CONTINUOUS PRN
Status: DISCONTINUED | OUTPATIENT
Start: 2024-11-06 | End: 2024-11-08 | Stop reason: HOSPADM

## 2024-11-06 RX ADMIN — TERBUTALINE SULFATE 0.25 MG: 1 INJECTION SUBCUTANEOUS at 06:11

## 2024-11-06 RX ADMIN — SODIUM CHLORIDE, POTASSIUM CHLORIDE, SODIUM LACTATE AND CALCIUM CHLORIDE: 600; 310; 30; 20 INJECTION, SOLUTION INTRAVENOUS at 06:11

## 2024-11-06 RX ADMIN — HYDROCODONE BITARTRATE AND ACETAMINOPHEN 1 TABLET: 5; 325 TABLET ORAL at 12:11

## 2024-11-06 RX ADMIN — SODIUM CHLORIDE, POTASSIUM CHLORIDE, SODIUM LACTATE AND CALCIUM CHLORIDE: 600; 310; 30; 20 INJECTION, SOLUTION INTRAVENOUS at 04:11

## 2024-11-06 RX ADMIN — EPHEDRINE SULFATE 10 MG: 50 INJECTION INTRAVENOUS at 06:11

## 2024-11-06 RX ADMIN — SODIUM CHLORIDE: 9 INJECTION, SOLUTION INTRAVENOUS at 06:11

## 2024-11-06 RX ADMIN — SODIUM CHLORIDE, POTASSIUM CHLORIDE, SODIUM LACTATE AND CALCIUM CHLORIDE 500 ML: 600; 310; 30; 20 INJECTION, SOLUTION INTRAVENOUS at 02:11

## 2024-11-06 RX ADMIN — LIDOCAINE HYDROCHLORIDE 200 MG: 20 INJECTION, SOLUTION INTRAVENOUS at 04:11

## 2024-11-06 RX ADMIN — OXYTOCIN 2 MILLI-UNITS/MIN: 10 INJECTION INTRAVENOUS at 06:11

## 2024-11-06 RX ADMIN — ONDANSETRON 4 MG: 2 INJECTION INTRAMUSCULAR; INTRAVENOUS at 03:11

## 2024-11-06 RX ADMIN — PRENATAL VITAMINS-IRON FUMARATE 27 MG IRON-FOLIC ACID 0.8 MG TABLET 1 TABLET: at 10:11

## 2024-11-06 RX ADMIN — LIDOCAINE HYDROCHLORIDE 4 ML: 20 INJECTION, SOLUTION EPIDURAL; INFILTRATION; INTRACAUDAL; PERINEURAL at 05:11

## 2024-11-06 RX ADMIN — HYDROCODONE BITARTRATE AND ACETAMINOPHEN 1 TABLET: 5; 325 TABLET ORAL at 09:11

## 2024-11-06 RX ADMIN — IBUPROFEN 800 MG: 800 TABLET, FILM COATED ORAL at 11:11

## 2024-11-06 RX ADMIN — ROPIVACAINE HYDROCHLORIDE 500 MCG: 10 INJECTION, SOLUTION EPIDURAL at 05:11

## 2024-11-06 RX ADMIN — LABETALOL HYDROCHLORIDE 80 MG: 5 INJECTION, SOLUTION INTRAVENOUS at 02:11

## 2024-11-06 RX ADMIN — BUTORPHANOL TARTRATE 2 MG: 2 INJECTION, SOLUTION INTRAMUSCULAR; INTRAVENOUS at 01:11

## 2024-11-06 RX ADMIN — IBUPROFEN 800 MG: 800 TABLET, FILM COATED ORAL at 09:11

## 2024-11-06 NOTE — L&D DELIVERY NOTE
[facility] -  Labor and Delivery  Vaginal Delivery   Operative Note    SUMMARY     I was called to the room when I was on the floor due to fetal late decelerations. She recently had an epidural and her BP went from elevated to low. She was treated with ephedrine and terbutaline (due to tachysystole. She also progressed quickly. She was receiving intrauterine resuscitation with O2, IVFs, and on her side when I arrived. I ordered another dose of terbutaline. (Clarisa Ibarra was en route to the hospital as this is her patient.) An amnioinfusion ordered.  She was 9/80/+1 when I arrived. IUPC and FSE placed. Amnioinfusion started. Then she changed to 10/100/+2 in about 5 minutes.   Discussed with the patient that expedited delivery recommended via a VAVD or  section. However, since she was close, VAVD recommended. She was transitioned to stirrups to push.   Kiwi applied. With the next 2 contractions, gentle traction applied to the fetal scalp while pushing. No pop offs. With the second contraction, the infant's head delivered in OA position. Then it was noted that there was a left shoulder dystocia. She stopped pushing. She was put in McRobert's position. Suprapubic pressure applied to the maternal right and the left shoulder was relieved and infant delivered. Nuchal cord x 1 reduced at delivery. Infant placed on mom's chest for skin to skin.     Vacuum assisted vaginal delivery of live infant, was placed on mothers abdomen for skin to skin and bulb suctioning performed.  Infant delivered position OA  over second degree midline laceration .  Nuchal cord: Yes, cord reduced following delivery.    Spontaneous delivery of placenta and IV pitocin given noting good uterine tone.  2nd degree laceration noted and repaired in normal fashion with 2-0 Vicryl in usual fashion .  Patient tolerated delivery well. Sponge needle and lap counted correctly x2.    Indications: Pre-eclampsia in third trimester  Pregnancy  "complicated by:   Patient Active Problem List   Diagnosis    Pre-eclampsia in third trimester    Diet controlled gestational diabetes mellitus (GDM) in third trimester    Multigravida of advanced maternal age in third trimester    38 weeks gestation of pregnancy     Admitting GA: 38w2d    Delivery Information for Derrell Adamson    Birth information:  YOB: 2023   Time of birth: 7:07 AM   Sex: female   Head Delivery Date/Time: 2024  7:06 AM   Delivery type: Vaginal, Vacuum (Extractor)   Gestational Age: 38w2d       Delivery Providers    Delivering clinician: Zoltan Del Rio MD           Measurements    Weight: 3059 g  Weight (lbs): 6 lb 11.9 oz  Length: 48.3 cm  Length (in): 19"         Apgars    Living status: Living  Apgar Component Scores:  1 min.:  5 min.:  10 min.:  15 min.:  20 min.:    Skin color:  0  1       Heart rate:  2  2       Reflex irritability:  2  2       Muscle tone:  2  2       Respiratory effort:  2  2       Total:  8  9       Apgars assigned by: BLADIMIR ORO RN         Operative Delivery    Forceps attempted?: No  Vacuum extractor attempted?: Yes  Vacuum indications: Fetal Heart Rate or Rhythm Abnormality  Vacuum type: Kiwi  First attempt time vacuum applied: 2024 07:00:00  First attempt time vacuum removed: 2024 07:07:00  Number of pop offs: 1  Number of pulls with vacuum: 4  Failed vacuum delivery?: No         Shoulder Dystocia    Shoulder dystocia present?: Yes  Anterior shoulder: left  Time recognized: 2024 07:06:00  Time help called: 2024 07:06:00  Help called by: MOISÉS Loyd RN  Physician/Provider arrived: 2024 06:50:00  Physician/Provider: Dr. Del Rio  NICU arrived: 2024 07:05:00  Additional staff arrived: 2024 07:06:00  Gentle attempt at traction, assisted by maternal expulsive forces?: Yes  First maneuver: Tho maneuver, suprapubic pressure  First maneuver performed at: 2024 07:06:00  First maneuver performed by: OTTO Warner" RN           Presentation    Presentation: Vertex           Interventions/Resuscitation    Method: Bulb Suctioning, Tactile Stimulation, NICU Attended       Cord    Vessels: 3 vessels  Complications: Nuchal  Nuchal Intervention: reduced  Nuchal Cord Description: loose nuchal cord  Number of Loops: 1  Cord Blood Disposition: Sent with Baby  Gases Sent?: Yes  Stem Cell Collection (by MD): No       Placenta    Placenta delivery date/time: 2024 0711  Placenta removal: Spontaneous  Placenta appearance: Intact  Placenta disposition: Discarded           Labor Events:       labor: No     Labor Onset Date/Time: 2024 12:27     Dilation Complete Date/Time: 2024 07:00     Start Pushing Date/Time: 2024 07:00     Rupture Date/Time: 24 0325        Rupture type: SRM (Spontaneous Rupture)        Fluid Amount:       Fluid Color: Clear      steroids: None     Antibiotics given for GBS: No     Induction: misoprostol;oxytocin     Indications for induction:  Elective     Augmentation:       Indications for augmentation:       Labor complications: Shoulder Dystocia;Fetal Intolerance     Additional complications:          Cervical ripening:          Misoprostol          Delivery:      Episiotomy: None     Indication for Episiotomy:       Perineal Lacerations: 2nd Repaired:  Yes   Periurethral Laceration:   Repaired:     Labial Laceration:   Repaired:     Sulcus Laceration:   Repaired:     Vaginal Laceration: Yes Repaired: Yes   Cervical Laceration:   Repaired:     Repair suture:       Repair # of packets:       Last Value - EBL - Nursing (mL):       Sum - EBL - Nursing (mL): 0     Last Value - EBL - Anesthesia (mL):      Calculated QBL (mL): 350     Running total QBL (mL): 350     Vaginal Sweep Performed: Yes     Surgicount Correct: Yes       Other providers:       Anesthesia    Method: Epidural  Analgesics: STADOL INJ          Details (if applicable):  Trial of Labor       Categorization:      Priority:     Indications for :     Incision Type:       Additional  information:  Forceps:    Vacuum:    Breech:    Observed anomalies    Other (Comments):

## 2024-11-06 NOTE — LACTATION NOTE
This note was copied from a baby's chart.  To moms room to assist with breastfeeding, infant skin to skin, sleeping with no feeding cues, discussed waking infant for feeds, encouraged skin to skin during very sleepy times, mom moved infant to breast, a couple small sucks noted, infant sleeping

## 2024-11-06 NOTE — LACTATION NOTE
This note was copied from a baby's chart.  Breastfeeding rounds done, mom concerned about not having breast milk, discussed normal milk production with mom, encouraged frequent feedings and skin to skin, mom encouraged to call for observation with next feed. Mom denies questions or concerns at this time, mom to call with any needs

## 2024-11-06 NOTE — ANESTHESIA PREPROCEDURE EVALUATION
2024  Dalila Adamson is a 39 y.o., female.      Pre-op Assessment    I have reviewed the Patient Summary Reports.       I have reviewed the Medications.     Review of Systems         Anesthesia Plan  Type of Anesthesia, risks & benefits discussed:    Anesthesia Type: Epidural  Intra-op Monitoring Plan: Standard ASA Monitors  Post Op Pain Control Plan: IV/PO Opioids PRN  Induction:  IV  Informed Consent: Informed consent signed with the Patient and all parties understand the risks and agree with anesthesia plan.  All questions answered.   ASA Score: 2    Ready For Surgery From Anesthesia Perspective.     .  No known anesthetic complications  NKDA    Hct 37     at 38 weeks    Adequate ROM at neck    Plan is labor epidural

## 2024-11-06 NOTE — ANESTHESIA PROCEDURE NOTES
Epidural    Patient location during procedure: OB   Reason for block: primary anesthetic   Reason for block: labor analgesia requested by patient and obstetrician  Diagnosis: IUP   Start time: 11/6/2024 5:09 AM  Timeout: 11/6/2024 5:08 AM  End time: 11/6/2024 5:19 AM    Staffing  Performing Provider: Mavr Patricia MD  Authorizing Provider: Marv Patricia MD    Staffing  Performed by: Marv Patricia MD  Authorized by: Marv Patricia MD        Preanesthetic Checklist  Completed: patient identified, pre-op evaluation, timeout performed, anesthesia consent given, hand hygiene performed and patient being monitored  Preparation  Patient position: sitting  Prep: Betadine  Reason for block: primary anesthetic   Epidural  Skin Anesthetic: lidocaine 1%  Administration type: single shot  Approach: midline  Interspace: L4-5    Injection technique: GONSALO air  Needle and Epidural Catheter  Insertion Attempts: 2  Test dose: 3 mL of lidocaine 1.5% with Epi 1-to-200,000  Needle localization: anatomical landmarks  Assessment  Ease of block: easy  Additional Notes  Informed consent. Aseptic technique.  L4/5 epidural catheter.  No complications.         Medications:    Medications: LIDOcaine (PF) 20 mg/mL (2%) injection - Epidural   4 mL - 11/6/2024 5:18:00 AM

## 2024-11-06 NOTE — PROGRESS NOTES
Called at approximately 0645 to come to bedside emergently for nonreassuring fetal heart tones. Decelerations noted. Primary provider unavailable. Active maternal resuscitative measures in progress at time of my arrival to- room around 0646. Cervix 8/100/-1. During examination, fetus with return to 120s HR around 0648. At this time, Dr. Del Rio to bedside and assumed care of patient.

## 2024-11-06 NOTE — PLAN OF CARE
Problem: Adult Inpatient Plan of Care  Goal: Plan of Care Review  Outcome: Progressing  Goal: Patient-Specific Goal (Individualized)  Outcome: Progressing  Goal: Absence of Hospital-Acquired Illness or Injury  Outcome: Progressing  Goal: Optimal Comfort and Wellbeing  Outcome: Progressing  Goal: Readiness for Transition of Care  Outcome: Progressing     Problem: Infection  Goal: Absence of Infection Signs and Symptoms  Outcome: Progressing     Problem:  Fall Injury Risk  Goal: Absence of Fall, Infant Drop and Related Injury  Outcome: Progressing     Problem: Postpartum (Vaginal Delivery)  Goal: Successful Parent Role Transition  Outcome: Progressing  Goal: Hemostasis  Outcome: Progressing  Goal: Absence of Infection Signs and Symptoms  Outcome: Progressing  Goal: Anesthesia/Sedation Recovery  Outcome: Progressing  Goal: Optimal Pain Control and Function  Outcome: Progressing  Goal: Effective Urinary Elimination  Outcome: Progressing     Problem: Breastfeeding  Goal: Effective Breastfeeding  Outcome: Progressing

## 2024-11-06 NOTE — PLAN OF CARE
Problem: Adult Inpatient Plan of Care  Goal: Plan of Care Review  2024 by Dk Loyd RN  Outcome: Progressing  2024 by Dk Loyd RN  Outcome: Progressing  Goal: Patient-Specific Goal (Individualized)  2024 by Dk Loyd RN  Outcome: Progressing  2024 by Dk Loyd RN  Outcome: Progressing  Goal: Absence of Hospital-Acquired Illness or Injury  2024 by Dk Loyd RN  Outcome: Progressing  2024 by Dk Loyd RN  Outcome: Progressing  Goal: Optimal Comfort and Wellbeing  2024 by Dk Loyd RN  Outcome: Progressing  2024 by Dk Loyd RN  Outcome: Progressing  Goal: Readiness for Transition of Care  2024 by Dk Loyd RN  Outcome: Progressing  2024 by Dk Loyd RN  Outcome: Progressing     Problem: Infection  Goal: Absence of Infection Signs and Symptoms  2024 by Dk Loyd RN  Outcome: Progressing  2024 by Dk Loyd RN  Outcome: Progressing     Problem:  Fall Injury Risk  Goal: Absence of Fall, Infant Drop and Related Injury  2024 by Dk Loyd RN  Outcome: Progressing  2024 by Dk Loyd RN  Outcome: Progressing     Problem: Postpartum (Vaginal Delivery)  Goal: Successful Parent Role Transition  Outcome: Progressing  Goal: Hemostasis  Outcome: Progressing  Goal: Absence of Infection Signs and Symptoms  Outcome: Progressing  Goal: Anesthesia/Sedation Recovery  Outcome: Progressing  Goal: Optimal Pain Control and Function  Outcome: Progressing  Goal: Effective Urinary Elimination  Outcome: Progressing     Problem: Breastfeeding  Goal: Effective Breastfeeding  Outcome: Progressing

## 2024-11-06 NOTE — PLAN OF CARE
Problem: Adult Inpatient Plan of Care  Goal: Plan of Care Review  Outcome: Progressing  Goal: Patient-Specific Goal (Individualized)  Outcome: Progressing  Goal: Absence of Hospital-Acquired Illness or Injury  Outcome: Progressing  Goal: Optimal Comfort and Wellbeing  Outcome: Progressing  Goal: Readiness for Transition of Care  Outcome: Progressing     Problem: Infection  Goal: Absence of Infection Signs and Symptoms  Outcome: Progressing     Problem:  Fall Injury Risk  Goal: Absence of Fall, Infant Drop and Related Injury  Outcome: Progressing

## 2024-11-07 LAB
BASOPHILS # BLD AUTO: 0.03 K/UL (ref 0–0.2)
BASOPHILS NFR BLD AUTO: 0.2 % (ref 0–1)
DIFFERENTIAL METHOD BLD: ABNORMAL
EOSINOPHIL # BLD AUTO: 0.08 K/UL (ref 0–0.5)
EOSINOPHIL NFR BLD AUTO: 0.5 % (ref 1–4)
ERYTHROCYTE [DISTWIDTH] IN BLOOD BY AUTOMATED COUNT: 13.5 % (ref 11.5–14.5)
HCT VFR BLD AUTO: 32.1 % (ref 38–47)
HGB BLD-MCNC: 10.8 G/DL (ref 12–16)
IMM GRANULOCYTES # BLD AUTO: 0.07 K/UL (ref 0–0.04)
IMM GRANULOCYTES NFR BLD: 0.5 % (ref 0–0.4)
LYMPHOCYTES # BLD AUTO: 2.3 K/UL (ref 1–4.8)
LYMPHOCYTES NFR BLD AUTO: 15.4 % (ref 27–41)
MCH RBC QN AUTO: 28.1 PG (ref 27–31)
MCHC RBC AUTO-ENTMCNC: 33.6 G/DL (ref 32–36)
MCV RBC AUTO: 83.6 FL (ref 80–96)
MONOCYTES # BLD AUTO: 0.97 K/UL (ref 0–0.8)
MONOCYTES NFR BLD AUTO: 6.5 % (ref 2–6)
MPC BLD CALC-MCNC: 11.5 FL (ref 9.4–12.4)
NEUTROPHILS # BLD AUTO: 11.48 K/UL (ref 1.8–7.7)
NEUTROPHILS NFR BLD AUTO: 76.9 % (ref 53–65)
NRBC # BLD AUTO: 0 X10E3/UL
NRBC, AUTO (.00): 0 %
PLATELET # BLD AUTO: 168 K/UL (ref 150–400)
RBC # BLD AUTO: 3.84 M/UL (ref 4.2–5.4)
UA COMPLETE W REFLEX CULTURE PNL UR: NORMAL
WBC # BLD AUTO: 14.93 K/UL (ref 4.5–11)

## 2024-11-07 PROCEDURE — 25000003 PHARM REV CODE 250: Performed by: ADVANCED PRACTICE MIDWIFE

## 2024-11-07 PROCEDURE — 11000001 HC ACUTE MED/SURG PRIVATE ROOM

## 2024-11-07 PROCEDURE — 0503F POSTPARTUM CARE VISIT: CPT | Mod: ,,, | Performed by: ADVANCED PRACTICE MIDWIFE

## 2024-11-07 PROCEDURE — 85025 COMPLETE CBC W/AUTO DIFF WBC: CPT | Performed by: ADVANCED PRACTICE MIDWIFE

## 2024-11-07 PROCEDURE — 36415 COLL VENOUS BLD VENIPUNCTURE: CPT | Performed by: ADVANCED PRACTICE MIDWIFE

## 2024-11-07 RX ORDER — IBUPROFEN 800 MG/1
800 TABLET ORAL EVERY 6 HOURS PRN
Qty: 30 TABLET | Refills: 0 | Status: SHIPPED | OUTPATIENT
Start: 2024-11-07

## 2024-11-07 RX ORDER — HYDROCODONE BITARTRATE AND ACETAMINOPHEN 5; 325 MG/1; MG/1
1 TABLET ORAL EVERY 4 HOURS PRN
Qty: 10 TABLET | Refills: 0 | Status: SHIPPED | OUTPATIENT
Start: 2024-11-07

## 2024-11-07 RX ORDER — HYDROCORTISONE 25 MG/G
CREAM TOPICAL 3 TIMES DAILY PRN
Qty: 28 G | Refills: 0 | Status: SHIPPED | OUTPATIENT
Start: 2024-11-07

## 2024-11-07 RX ADMIN — IBUPROFEN 800 MG: 800 TABLET, FILM COATED ORAL at 09:11

## 2024-11-07 RX ADMIN — PRENATAL VITAMINS-IRON FUMARATE 27 MG IRON-FOLIC ACID 0.8 MG TABLET 1 TABLET: at 09:11

## 2024-11-07 RX ADMIN — DOCUSATE SODIUM 200 MG: 100 CAPSULE, LIQUID FILLED ORAL at 03:11

## 2024-11-07 RX ADMIN — HYDROCODONE BITARTRATE AND ACETAMINOPHEN 1 TABLET: 5; 325 TABLET ORAL at 03:11

## 2024-11-07 RX ADMIN — HYDROCODONE BITARTRATE AND ACETAMINOPHEN 1 TABLET: 5; 325 TABLET ORAL at 09:11

## 2024-11-07 RX ADMIN — IBUPROFEN 800 MG: 800 TABLET, FILM COATED ORAL at 03:11

## 2024-11-07 NOTE — LACTATION NOTE
This note was copied from a baby's chart.  Breastfeeding rounds done, mom reports infant latching well, mom concerned with infant being able to breathe when feeding due to stuffy nose, mom encouraged to call with next feeding for observation. mom denies additional questions or concerns, mom to call with any needs

## 2024-11-07 NOTE — PLAN OF CARE
Problem: Adult Inpatient Plan of Care  Goal: Plan of Care Review  2024 by Dk Lody RN  Outcome: Progressing  2024 by Dk Loyd RN  Outcome: Progressing  Goal: Patient-Specific Goal (Individualized)  2024 by Dk Loyd RN  Outcome: Progressing  2024 by Dk Loyd RN  Outcome: Progressing  Goal: Absence of Hospital-Acquired Illness or Injury  2024 by Dk Loyd RN  Outcome: Progressing  2024 07 by Dk Loyd RN  Outcome: Progressing  Goal: Optimal Comfort and Wellbeing  2024 by Dk Loyd RN  Outcome: Progressing  2024 by Dk Loyd RN  Outcome: Progressing  Goal: Readiness for Transition of Care  2024 by Dk Loyd RN  Outcome: Progressing  2024 by Dk Loyd RN  Outcome: Progressing     Problem: Infection  Goal: Absence of Infection Signs and Symptoms  2024 by Dk Loyd RN  Outcome: Progressing  2024 by Dk Loyd RN  Outcome: Progressing     Problem:  Fall Injury Risk  Goal: Absence of Fall, Infant Drop and Related Injury  2024 by Dk Loyd RN  Outcome: Progressing  2024 by Dk Loyd RN  Outcome: Progressing     Problem: Postpartum (Vaginal Delivery)  Goal: Successful Parent Role Transition  2024 by Dk Lyod RN  Outcome: Progressing  2024 by Dk Loyd RN  Outcome: Progressing  Goal: Hemostasis  2024 by Dk Loyd RN  Outcome: Progressing  2024 by Dk Loyd RN  Outcome: Progressing  Goal: Absence of Infection Signs and Symptoms  2024 by Dk Loyd RN  Outcome: Progressing  2024 07 by kD Loyd RN  Outcome: Progressing  Goal: Anesthesia/Sedation Recovery  2024 1946 by Dk Loyd, RN  Outcome: Progressing  2024  0743 by Dk Loyd RN  Outcome: Progressing  Goal: Optimal Pain Control and Function  11/6/2024 1946 by Dk Loyd RN  Outcome: Progressing  11/6/2024 0743 by Dk Loyd RN  Outcome: Progressing  Goal: Effective Urinary Elimination  11/6/2024 1946 by Dk Loyd RN  Outcome: Progressing  11/6/2024 0743 by Dk Loyd RN  Outcome: Progressing     Problem: Breastfeeding  Goal: Effective Breastfeeding  11/6/2024 1946 by Dk Loyd RN  Outcome: Progressing  11/6/2024 0743 by Dk Loyd RN  Outcome: Progressing     Problem: Diabetes in Pregnancy  Goal: Optimal Coping  Outcome: Progressing  Goal: Blood Glucose Level Within Targeted Range  Outcome: Progressing

## 2024-11-07 NOTE — LACTATION NOTE
This note was copied from a baby's chart.  To mom room to assist with latching, infant awake, fussy at breast, holding in mouth, but no sucking, mom had expressing into infants mouth, frequent swallowing noted, mom to call with any needs

## 2024-11-07 NOTE — ANESTHESIA POSTPROCEDURE EVALUATION
Anesthesia Post Evaluation    Patient: Dalila Adamson    Procedure(s) Performed: * No procedures listed *    Final Anesthesia Type: epidural      Patient location during evaluation: labor & delivery  Post-procedure vital signs: reviewed and stable  Pain management: adequate  Airway patency: patent  JEAN mitigation strategies: Use of major conduction anesthesia (spinal/epidural) or peripheral nerve block  PONV status at discharge: No PONV  Anesthetic complications: no      Cardiovascular status: hemodynamically stable  Respiratory status: unassisted  Hydration status: euvolemic  Follow-up not needed.              Vitals Value Taken Time   /65 11/07/24 0809   Temp 36.8 °C (98.2 °F) 11/07/24 0809   Pulse 91 11/07/24 0809   Resp 18 11/07/24 0916   SpO2 99 % 11/07/24 0809         No case tracking events are documented in the log.      Pain/Cleo Score: Pain Rating Prior to Med Admin: 7 (11/7/2024  9:57 AM)  Pain Rating Post Med Admin: 0 (11/6/2024 10:28 PM)  Cleo Score: 9 (11/6/2024  7:30 AM)

## 2024-11-07 NOTE — PLAN OF CARE
Problem: Adult Inpatient Plan of Care  Goal: Plan of Care Review  Outcome: Progressing  Goal: Patient-Specific Goal (Individualized)  Outcome: Progressing  Goal: Absence of Hospital-Acquired Illness or Injury  Outcome: Progressing  Goal: Optimal Comfort and Wellbeing  Outcome: Progressing  Goal: Readiness for Transition of Care  Outcome: Progressing     Problem: Infection  Goal: Absence of Infection Signs and Symptoms  Outcome: Progressing     Problem:  Fall Injury Risk  Goal: Absence of Fall, Infant Drop and Related Injury  Outcome: Progressing     Problem: Postpartum (Vaginal Delivery)  Goal: Successful Parent Role Transition  Outcome: Progressing  Goal: Hemostasis  Outcome: Progressing  Goal: Absence of Infection Signs and Symptoms  Outcome: Progressing  Goal: Anesthesia/Sedation Recovery  Outcome: Progressing  Goal: Optimal Pain Control and Function  Outcome: Progressing  Goal: Effective Urinary Elimination  Outcome: Progressing     Problem: Breastfeeding  Goal: Effective Breastfeeding  Outcome: Progressing     Problem: Diabetes in Pregnancy  Goal: Optimal Coping  Outcome: Progressing  Goal: Blood Glucose Level Within Targeted Range  Outcome: Progressing

## 2024-11-07 NOTE — PROGRESS NOTES
[facility] -  Labor and Delivery  Obstetrics  Postpartum Progress Note    Patient Name: Dalila Adamson  MRN: [id number]  Admission Date: 11/5/2024  Hospital Length of Stay: 2 days  Attending Physician: Zoltan Del Rio MD  Primary Care Provider: Jasmina, Primary Doctor    Subjective:     Principal Problem:Pre-eclampsia in third trimester    Hospital course: Vaginal Delivery 11/6/24, epidural anesthesia, vacuum assist, nuchal cord x 1, shoulder dystocia    Interval History: Postpartum Day 1    She is doing well today. She is tolerating a regular diet without nausea or vomiting. She is voiding spontaneously. She is ambulating. She has passed flatus, and has not a BM. Vaginal bleeding is mild. She denies fever or chills. Abdominal and perineal pain is mild to moderate and controlled with oral medications. She Is breastfeeding. She desires circumcision for her male baby: not applicable.     Objective:     Vital Signs (Most Recent):  Temp: 97.3 °F (36.3 °C) (11/07/24 1529)  Pulse: 66 (11/07/24 1529)  Resp: 18 (11/07/24 1529)  BP: 136/78 (11/07/24 1529)  SpO2: 99 % (11/07/24 0809) Vital Signs (24h Range):  Temp:  [97.3 °F (36.3 °C)-98.8 °F (37.1 °C)] 97.3 °F (36.3 °C)  Pulse:  [66-91] 66  Resp:  [18-20] 18  SpO2:  [96 %-99 %] 99 %  BP: (130-146)/(65-78) 136/78      There is no height or weight on file to calculate BMI.    No intake or output data in the 24 hours ending 11/07/24 1752    Physical Exam:   Constitutional: She is oriented to person, place, and time. She appears well-developed and well-nourished.    HENT:   Head: Normocephalic.      Cardiovascular:  Normal rate and regular rhythm.      Exam reveals edema.        Pulmonary/Chest: Effort normal and breath sounds normal.        Abdominal: Soft. Bowel sounds are normal.   Fundus firm, non-tender     Genitourinary:    Genitourinary Comments: Perineum well-approximated             Musculoskeletal: Normal range of motion. Edema present.       Neurological: She is  alert and oriented to person, place, and time.    Skin: Skin is warm and dry.    Psychiatric: She has a normal mood and affect.       Significant Labs:  Lab Results   Component Value Date    GROUPTRH B POS 2024    HEPBSAG Non-Reactive 2024    RUBELLAIGGSC Reacitve (Immune) 2024     Recent Labs   Lab 24  0236   HGB 10.8*   HCT 32.1*       I have personallly reviewed all pertinent lab results from the last 24 hours.    Assessment/Plan:     39 y.o. female  at 38w2d for:    Active Diagnoses:    Diagnosis Date Noted POA    PRINCIPAL PROBLEM:  Pre-eclampsia in third trimester [O14.93] 2024 Yes    Diet controlled gestational diabetes mellitus (GDM) in third trimester [O24.410] 2024 Yes    Multigravida of advanced maternal age in third trimester [O09.523] 2024 Yes    38 weeks gestation of pregnancy [Z3A.38] 2024 Not Applicable      Problems Resolved During this Admission:     Routine postpartum care    Disposition: As patient meets milestones, will plan to discharge tomorrow.    Clarisa Ibarra CNM  Obstetrics  [facility] -  Labor and Delivery

## 2024-11-07 NOTE — H&P
[facility] -  Labor and Delivery  Obstetrics  History & Physical    Patient Name: Dalila Adamson  MRN: [id number]  Admission Date: 2024  Primary Care Provider: No, Primary Doctor    Subjective:     Principal Problem:Pre-eclampsia in third trimester    History of Present Illness: Pt was seen in the office today for routine OBV.  BP elevated at 140/88 and 150/92 with c/o intermittent h'aches, no visual changes.  Decision made to admit for IOL due to preeclampsia.  Pt reports good fetal movements, no LOF or vaginal bleeding.    She has had adequate PN care complicated by Gest Diabetes, diet controlled and AMA  GBS Negative    OB History    Para Term  AB Living   2 1 1 0 1 1   SAB IAB Ectopic Multiple Live Births   1 0 0 0 1      # Outcome Date GA Lbr Naeem/2nd Weight Sex Type Anes PTL Lv   2 Term 24 38w2d 18:33 / 00:07 3.059 kg (6 lb 11.9 oz) F Vag-Vacuum EPI N ABDOUL      Complications: Shoulder Dystocia, Fetal Intolerance      Name: Girl Dalila Adamson      Apgar1: 8  Apgar5: 9   1 SAB  7w0d            No past medical history on file.  Past Surgical History:   Procedure Laterality Date    TONSILLECTOMY         PTA Medications   Medication Sig    blood sugar diagnostic Strp 1 strip by Misc.(Non-Drug; Combo Route) route 4 (four) times daily.    blood-glucose meter Misc 1 each by Misc.(Non-Drug; Combo Route) route 4 (four) times daily. for 1 day    famotidine (PEPCID) 40 MG tablet Take 1 tablet (40 mg total) by mouth every evening.    lancets (UNIVERSAL 1 LANCETS) 30 gauge Misc 100 lancets by Misc.(Non-Drug; Combo Route) route 4 (four) times daily.       Review of patient's allergies indicates:  No Known Allergies     Family History    None       Tobacco Use    Smoking status: Never    Smokeless tobacco: Never   Substance and Sexual Activity    Alcohol use: Not Currently    Drug use: Never    Sexual activity: Yes     Partners: Male     Birth control/protection: None     Review of Systems  "  Constitutional:  Negative for fever.   Eyes:  Negative for visual disturbance.   Respiratory:  Negative for shortness of breath.    Cardiovascular:  Positive for leg swelling.   Gastrointestinal:  Negative for abdominal pain, nausea and vomiting.   Genitourinary:  Negative for vaginal bleeding.   Musculoskeletal:  Negative for back pain.   Neurological:  Negative for headaches.      Objective:     Vital Signs (Most Recent):  Temp: 97.3 °F (36.3 °C) (11/07/24 1529)  Pulse: 66 (11/07/24 1529)  Resp: 18 (11/07/24 1529)  BP: 136/78 (11/07/24 1529)  SpO2: 99 % (11/07/24 0809) Vital Signs (24h Range):  Temp:  [97.3 °F (36.3 °C)-98.8 °F (37.1 °C)] 97.3 °F (36.3 °C)  Pulse:  [66-91] 66  Resp:  [18-20] 18  SpO2:  [96 %-99 %] 99 %  BP: (130-146)/(65-78) 136/78        There is no height or weight on file to calculate BMI.    FHT: 130s Cat 1 (reassuring)  TOCO:  Q irregular minutes    Physical Exam:   Constitutional: She is oriented to person, place, and time. She appears well-developed and well-nourished. No distress.    HENT:   Head: Normocephalic.      Cardiovascular:  Normal rate and regular rhythm.      Exam reveals edema.        Pulmonary/Chest: Effort normal and breath sounds normal.        Abdominal: Soft.   Gravid, non-tender             Musculoskeletal: Moves all extremeties. Edema present.       Neurological: She is alert and oriented to person, place, and time. She has normal reflexes.    Skin: Skin is warm and dry.    Psychiatric: She has a normal mood and affect.       Cervix:  Dilation:  2  Effacement:  50%  Station: -3  Presentation: Vertex     Significant Labs:  Lab Results   Component Value Date    GROUPTRH B POS 11/05/2024    HEPBSAG Non-Reactive 11/05/2024    RUBELLAIGGSC Reacitve (Immune) 05/08/2024       CBC:   Recent Labs   Lab 11/07/24  0236   WBC 14.93*   RBC 3.84*   HGB 10.8*   HCT 32.1*      MCV 83.6   MCH 28.1   MCHC 33.6     CMP: No results for input(s): "GLU", "CALCIUM", "ALBUMIN", "PROT", " ""NA", "K", "CO2", "CL", "BUN", "CREATININE", "ALKPHOS", "ALT", "AST", "BILITOT" in the last 48 hours.  I have personallly reviewed all pertinent lab results from the last 24 hours.    Assessment/Plan:     39 y.o. female  at 38w2d for:    Active Diagnoses:    Diagnosis Date Noted POA    PRINCIPAL PROBLEM:  Pre-eclampsia in third trimester [O14.93] 2024 Yes    Diet controlled gestational diabetes mellitus (GDM) in third trimester [O24.410] 2024 Yes    Multigravida of advanced maternal age in third trimester [O09.523] 2024 Yes    38 weeks gestation of pregnancy [Z3A.38] 2024 Not Applicable      Problems Resolved During this Admission:       Continue Cytotec q4h vaginally until SROM or pt is 3cm  BP protocol  Epidural when pt desires  Discussed pt status with Dr Amaya and she is in agreement with POC    Clarisa Ibarra CNM  Obstetrics  [facility] -  Labor and Delivery      "

## 2024-11-08 VITALS
HEART RATE: 75 BPM | SYSTOLIC BLOOD PRESSURE: 142 MMHG | OXYGEN SATURATION: 98 % | TEMPERATURE: 98 F | RESPIRATION RATE: 20 BRPM | DIASTOLIC BLOOD PRESSURE: 78 MMHG

## 2024-11-08 PROCEDURE — 25000003 PHARM REV CODE 250: Performed by: ADVANCED PRACTICE MIDWIFE

## 2024-11-08 RX ADMIN — IBUPROFEN 800 MG: 800 TABLET, FILM COATED ORAL at 05:11

## 2024-11-08 RX ADMIN — PRENATAL VITAMINS-IRON FUMARATE 27 MG IRON-FOLIC ACID 0.8 MG TABLET 1 TABLET: at 09:11

## 2024-11-08 RX ADMIN — HYDROCORTISONE 2.5%: 25 CREAM TOPICAL at 02:11

## 2024-11-08 RX ADMIN — HYDROCODONE BITARTRATE AND ACETAMINOPHEN 1 TABLET: 5; 325 TABLET ORAL at 03:11

## 2024-11-08 RX ADMIN — IBUPROFEN 800 MG: 800 TABLET, FILM COATED ORAL at 09:11

## 2024-11-08 RX ADMIN — DOCUSATE SODIUM 200 MG: 100 CAPSULE, LIQUID FILLED ORAL at 10:11

## 2024-11-08 RX ADMIN — HYDROCODONE BITARTRATE AND ACETAMINOPHEN 1 TABLET: 5; 325 TABLET ORAL at 05:11

## 2024-11-08 RX ADMIN — IBUPROFEN 800 MG: 800 TABLET, FILM COATED ORAL at 03:11

## 2024-11-08 RX ADMIN — HYDROCODONE BITARTRATE AND ACETAMINOPHEN 1 TABLET: 5; 325 TABLET ORAL at 09:11

## 2024-11-08 NOTE — PHYSICIAN QUERY
Please clarify the diagnosis associated with the documentation of pre-eclampsia:   Severe pre-eclampsia

## 2024-11-08 NOTE — DISCHARGE INSTRUCTIONS
PP Education Booklet Given ---- pe 11/08/24 at 13:34 Discharge book given. All discharge instructions were given on 11/08/24 at 11:34,pt verbalized understanding with PP booklet given   Skin to skin 18 pe     Rooming in 29 pe     Importance of Exclusive Breastfeeding for 6 mo 35 pe     Bleeding 6 pe     Incision Care 10 pe     Sex 11 pe     Pain Management 8 pe     Perineal Care 9 pe     Minimizing Engorgement 40 pe     Collection & Storage of BM 42-43 pe     S/S of BF Problems  pe     Hand Expression 42 pe     Maternal s/s breast problems 41 pe     Mgnt of Common BF Problems (engorgement, sore & cracked nipples) 40-41 pe     PPD/Anxiety 14-15 pe

## 2024-11-08 NOTE — PLAN OF CARE
Problem: Adult Inpatient Plan of Care  Goal: Plan of Care Review  Outcome: Met  Goal: Patient-Specific Goal (Individualized)  Outcome: Met  Goal: Absence of Hospital-Acquired Illness or Injury  Outcome: Met  Goal: Optimal Comfort and Wellbeing  Outcome: Met  Goal: Readiness for Transition of Care  Outcome: Met     Problem: Infection  Goal: Absence of Infection Signs and Symptoms  Outcome: Met     Problem:  Fall Injury Risk  Goal: Absence of Fall, Infant Drop and Related Injury  Outcome: Met     Problem: Postpartum (Vaginal Delivery)  Goal: Successful Parent Role Transition  Outcome: Met  Goal: Hemostasis  Outcome: Met  Goal: Absence of Infection Signs and Symptoms  Outcome: Met  Goal: Anesthesia/Sedation Recovery  Outcome: Met  Goal: Optimal Pain Control and Function  Outcome: Met  Goal: Effective Urinary Elimination  Outcome: Met     Problem: Breastfeeding  Goal: Effective Breastfeeding  Outcome: Met     Problem: Diabetes in Pregnancy  Goal: Optimal Coping  Outcome: Met  Goal: Blood Glucose Level Within Targeted Range  Outcome: Met

## 2024-11-08 NOTE — PLAN OF CARE
Problem: Adult Inpatient Plan of Care  Goal: Plan of Care Review  Outcome: Progressing  Goal: Patient-Specific Goal (Individualized)  Outcome: Progressing  Goal: Absence of Hospital-Acquired Illness or Injury  Outcome: Progressing  Goal: Optimal Comfort and Wellbeing  Outcome: Progressing  Goal: Readiness for Transition of Care  Outcome: Progressing     Problem: Infection  Goal: Absence of Infection Signs and Symptoms  Outcome: Progressing     Problem:  Fall Injury Risk  Goal: Absence of Fall, Infant Drop and Related Injury  Outcome: Progressing     Problem: Postpartum (Vaginal Delivery)  Goal: Successful Parent Role Transition  Outcome: Progressing  Goal: Hemostasis  Outcome: Progressing  Goal: Absence of Infection Signs and Symptoms  Outcome: Progressing  Goal: Anesthesia/Sedation Recovery  Outcome: Progressing  Goal: Optimal Pain Control and Function  Outcome: Progressing  Goal: Effective Urinary Elimination  Outcome: Progressing     Problem: Breastfeeding  Goal: Effective Breastfeeding  Outcome: Progressing     Problem: Diabetes in Pregnancy  Goal: Optimal Coping  Outcome: Progressing  Goal: Blood Glucose Level Within Targeted Range  Outcome: Progressing   Preparing for discharge

## 2024-11-08 NOTE — LACTATION NOTE
This note was copied from a baby's chart.  Breastfeeding rounds done, mom reports infant latching well today, mom denies questions or concerns, mom to call with any breastfeeding needs

## 2024-11-10 NOTE — DISCHARGE SUMMARY
"[facility] -  Labor and Delivery  Obstetrics  Discharge Summary      Patient Name: Dalila Adamson  MRN: [id number]  Admission Date: 11/5/2024  Hospital Length of Stay: 3 days  Discharge Date and Time:  11/8/24  Attending Physician: Jasmina att. providers found   Discharging Provider: Clarisa Ibarra CNM  Primary Care Provider: Jasmina, Primary Doctor    HPI: Pt is awake and alert with no s/s distress.  Tolerating ambulation and regular diet.  Pain is well controlled with prn meds.  Voiding and passing gas.  Abd soft, non-tender. Small amount lochia rubra. No edema lower extrem.      * No surgery found *     Hospital Course: Vaginal Delivery, vacuum assist, on 11/6/24, shoulder dystocia with tight nuchal cord per Dr Del Rio.  Postpartum course has been uncomplicated.  Infant is doing well and admitted to NICU for jaundice.     Final Active Diagnoses:    Diagnosis Date Noted POA    PRINCIPAL PROBLEM:  Encounter for vaginal delivery [O80] 11/07/2024 Not Applicable    Shoulder dystocia during labor and delivery [O66.0] 11/07/2024 No    Nuchal cord affecting delivery [O69.81X0] 11/07/2024 No    Pre-eclampsia in third trimester [O14.93] 11/06/2024 Yes    Diet controlled gestational diabetes mellitus (GDM) in third trimester [O24.410] 11/06/2024 Yes    Multigravida of advanced maternal age in third trimester [O09.523] 11/06/2024 Yes    38 weeks gestation of pregnancy [Z3A.38] 11/06/2024 Not Applicable      Problems Resolved During this Admission:        Labs: CBC No results for input(s): "WBC", "HGB", "HCT", "PLT" in the last 48 hours.    Feeding Method: breast    Immunizations       Date Immunization Status Dose Route/Site Given by    11/08/24 1200 MMR Deferred 0.5 mL Subcutaneous/ Faith, Belen, LPN    11/08/24 1200 Tdap Deferred 0.5 mL Intramuscular/ Faith, Belen, LPN            Delivery:    Episiotomy: None   Lacerations: 2nd   Repair suture:     Repair # of packets:     Blood loss (ml):       Birth information:  Date " "of birth: 2024   Time of birth: 7:07 AM   Sex: female   Delivery type: Vaginal, Vacuum (Extractor)   Gestational Age: 38w2d     Measurements    Weight: 3059 g  Weight (lbs): 6 lb 11.9 oz  Length: 48.3 cm  Length (in): 19"         Delivery Clinician: Delivery Providers    Delivering clinician: Zoltan Del Rio MD          Additional  information:  Forceps:    Vacuum:    Breech:    Observed anomalies      Living?:     Apgars    Living status: Living  Apgar Component Scores:  1 min.:  5 min.:  10 min.:  15 min.:  20 min.:    Skin color:  0  1       Heart rate:  2  2       Reflex irritability:  2  2       Muscle tone:  2  2       Respiratory effort:  2  2       Total:  8  9       Apgars assigned by: BLADIMIR ORO RN         Placenta: Delivered:       appearance  Pending Diagnostic Studies:       None            Discharged Condition: good    Disposition: Home or Self Care    Follow Up:   Follow-up Information       Clarisa Ibarra CNM Follow up in 3 week(s).    Specialty: Obstetrics and Gynecology  Why: Postpartum Check  Contact information:  [address] [facility] [address]  [phone]                           Patient Instructions:      Pelvic Rest   Order Comments: 6 weeks     No driving until:   Order Comments: 1-2 weeks     Notify your health care provider if you experience any of the following:  increased confusion or weakness     Notify your health care provider if you experience any of the following:  persistent dizziness, light-headedness, or visual disturbances     Notify your health care provider if you experience any of the following:  worsening rash     Notify your health care provider if you experience any of the following:  severe persistent headache     Notify your health care provider if you experience any of the following:  difficulty breathing or increased cough     Notify your health care provider if you experience any of the following:  severe uncontrolled pain     Notify your health care provider " if you experience any of the following:  persistent nausea and vomiting or diarrhea     Notify your health care provider if you experience any of the following:  temperature >100.4     Medications:  Discharge Medication List as of 11/8/2024  1:38 PM        START taking these medications    Details   benzocaine-lanolin (DERMOPLAST) 20-0.5 % Aero Apply topically continuously as needed (perineal discomfort)., Starting Thu 11/7/2024, Normal      HYDROcodone-acetaminophen (NORCO) 5-325 mg per tablet Take 1 tablet by mouth every 4 (four) hours as needed for Pain., Starting Thu 11/7/2024, Normal      hydrocortisone (ANUSOL-HC) 2.5 % rectal cream Place rectally 3 (three) times daily as needed for Hemorrhoids., Starting Thu 11/7/2024, Normal      ibuprofen (ADVIL,MOTRIN) 800 MG tablet Take 1 tablet (800 mg total) by mouth every 6 (six) hours as needed (cramping)... take with food, Starting Thu 11/7/2024, Normal           CONTINUE these medications which have NOT CHANGED    Details   blood sugar diagnostic Strp 1 strip by Misc.(Non-Drug; Combo Route) route 4 (four) times daily., Starting Fri 10/25/2024, Normal      blood-glucose meter Misc 1 each by Misc.(Non-Drug; Combo Route) route 4 (four) times daily. for 1 day, Starting Tue 9/3/2024, Until Wed 9/4/2024, Normal      famotidine (PEPCID) 40 MG tablet Take 1 tablet (40 mg total) by mouth every evening., Starting Mon 9/9/2024, Until Tue 9/9/2025, Normal      lancets (UNIVERSAL 1 LANCETS) 30 gauge Misc 100 lancets by Misc.(Non-Drug; Combo Route) route 4 (four) times daily., Starting Tue 9/3/2024, Until Wed 9/3/2025, Normal             Clarisa Ibarra CNM  Obstetrics  [facility] -  Labor and Delivery

## 2024-11-14 ENCOUNTER — PATIENT MESSAGE (OUTPATIENT)
Dept: OBSTETRICS AND GYNECOLOGY | Facility: CLINIC | Age: 39
End: 2024-11-14
Payer: COMMERCIAL

## 2024-11-14 RX ORDER — LABETALOL 100 MG/1
100 TABLET, FILM COATED ORAL 2 TIMES DAILY
Qty: 60 TABLET | Refills: 3 | Status: SHIPPED | OUTPATIENT
Start: 2024-11-14 | End: 2025-11-14

## 2024-11-25 ENCOUNTER — TELEPHONE (OUTPATIENT)
Dept: RADIOLOGY | Facility: HOSPITAL | Age: 39
End: 2024-11-25
Payer: COMMERCIAL

## 2024-11-25 NOTE — TELEPHONE ENCOUNTER
----- Message from Med Assistant Alton sent at 11/22/2024 10:35 AM CST -----  Urvashi said to get her in on Monday or tuesday of next week.  ----- Message -----  From: Ayaka Brooks  Sent: 11/22/2024  10:14 AM CST  To: Alton Mckinney MA    She has an appointment on 12/04; do I need to work her in sooner?  ----- Message -----  From: Alton Mckinney MA  Sent: 11/22/2024   7:00 AM CST  To: Ayaka Brooks      ----- Message -----  From: Мария Goode  Sent: 11/21/2024   3:45 PM CST  To: Fred Mckenna Staff    Type:  Sooner Apoointment Request    Caller is requesting a sooner appointment.  Caller declined first available appointment listed below.  Caller will not accept being placed on the waitlist and is requesting a message be sent to doctor.  Name of Caller: Pt  When is the first available appointment?  Symptoms: f/u appt  Would the patient rather a call back or a response via [facility]? Call  Best Call Back Number:  [phone]  Additional Information:

## 2024-11-27 ENCOUNTER — TELEPHONE (OUTPATIENT)
Dept: OBSTETRICS AND GYNECOLOGY | Facility: CLINIC | Age: 39
End: 2024-11-27
Payer: COMMERCIAL

## 2024-11-27 NOTE — TELEPHONE ENCOUNTER
----- Message from Todd Dang sent at 11/21/2024  4:53 PM CST -----    ----- Message -----  From: Мария Goode  Sent: 11/21/2024   3:45 PM CST  To: Fred Mckenna Staff    Type:  Sooner Apoointment Request    Caller is requesting a sooner appointment.  Caller declined first available appointment listed below.  Caller will not accept being placed on the waitlist and is requesting a message be sent to doctor.  Name of Caller: Pt  When is the first available appointment?  Symptoms: f/u appt  Would the patient rather a call back or a response via [facility]? Call  Best Call Back Number:  [phone]  Additional Information:

## 2024-12-04 ENCOUNTER — POSTPARTUM VISIT (OUTPATIENT)
Dept: OBSTETRICS AND GYNECOLOGY | Facility: CLINIC | Age: 39
End: 2024-12-04
Payer: COMMERCIAL

## 2024-12-04 VITALS
OXYGEN SATURATION: 100 % | SYSTOLIC BLOOD PRESSURE: 118 MMHG | HEART RATE: 80 BPM | RESPIRATION RATE: 19 BRPM | BODY MASS INDEX: 29.87 KG/M2 | HEIGHT: 64 IN | DIASTOLIC BLOOD PRESSURE: 78 MMHG

## 2024-12-04 DIAGNOSIS — Z30.09 GENERAL COUNSELING AND ADVICE ON CONTRACEPTIVE MANAGEMENT: ICD-10-CM

## 2024-12-04 DIAGNOSIS — Z86.32 HISTORY OF GESTATIONAL DIABETES: ICD-10-CM

## 2024-12-04 DIAGNOSIS — Z13.32 ENCOUNTER FOR SCREENING FOR MATERNAL DEPRESSION: Primary | ICD-10-CM

## 2024-12-04 PROCEDURE — 1111F DSCHRG MED/CURRENT MED MERGE: CPT | Mod: ,,, | Performed by: ADVANCED PRACTICE MIDWIFE

## 2024-12-04 PROCEDURE — 99999 PR PBB SHADOW E&M-EST. PATIENT-LVL IV: CPT | Mod: PBBFAC,,, | Performed by: ADVANCED PRACTICE MIDWIFE

## 2024-12-04 PROCEDURE — 99214 OFFICE O/P EST MOD 30 MIN: CPT | Mod: PBBFAC | Performed by: ADVANCED PRACTICE MIDWIFE

## 2024-12-04 PROCEDURE — 0503F POSTPARTUM CARE VISIT: CPT | Mod: S$PBB,,, | Performed by: ADVANCED PRACTICE MIDWIFE

## 2024-12-04 NOTE — PROGRESS NOTES
"Subjective:       Dalila Adamson is a 39 y.o. female who presents for a postpartum visit. She is 4 weeks postpartum following a spontaneous vaginal delivery. I have fully reviewed the prenatal and intrapartum course. The delivery was at 38 gestational weeks. Outcome: spontaneous vaginal delivery and vacuum, mid. Anesthesia: epidural. Postpartum course has been uncomplicated. Baby's course has been uncomplicated. Baby is feeding by bottle . Bleeding thin lochia. Bowel function is normal. Bladder function is normal. Patient is not sexually active. Contraception method is abstinence. Postpartum depression screening: negative.    The following portions of the patient's history were reviewed and updated as appropriate: allergies, current medications, past family history, past medical history, past social history, past surgical history, and problem list.    Review of Systems  Pertinent items are noted in HPI.   OB History    Para Term  AB Living   2 1 1 0 1 1   SAB IAB Ectopic Multiple Live Births   1 0 0 0 1      # Outcome Date GA Lbr Naeem/2nd Weight Sex Type Anes PTL Lv   2 Term 24 38w2d 18:33 / 00:07 3.059 kg (6 lb 11.9 oz) F Vag-Vacuum EPI N ABDOUL      Complications: Shoulder Dystocia, Fetal Intolerance      Name: Agnes Adamson      Apgar1: 8  Apgar5: 9   1 SAB  7w0d            Objective:      /78 (BP Location: Right arm, Patient Position: Sitting)   Pulse 80   Resp 19   Ht 5' 4" (1.626 m)   LMP 2024   SpO2 100%   Breastfeeding No   BMI 29.87 kg/m²    General:  alert, appears stated age, and cooperative       Lungs: clear to auscultation bilaterally   Heart:  regular rate and rhythm   Abdomen: soft, non-tender; bowel sounds normal; no masses,  no organomegaly              Extremities WNL, no edema   Skin Warm and Dry                      Assessment:     1. Encounter for screening for maternal depression        2. General counseling and advice on contraceptive management      "   3. History of gestational diabetes  Hemoglobin A1C    Glucose, Random        Plan:      1. Contraception: abstinence  2. Plans on natural family planning for contraception   3. Follow up in: 5 weeks for postpartum visit or as needed.

## 2025-01-09 ENCOUNTER — POSTPARTUM VISIT (OUTPATIENT)
Dept: OBSTETRICS AND GYNECOLOGY | Facility: CLINIC | Age: 40
End: 2025-01-09
Payer: COMMERCIAL

## 2025-01-09 VITALS
DIASTOLIC BLOOD PRESSURE: 80 MMHG | SYSTOLIC BLOOD PRESSURE: 122 MMHG | HEIGHT: 64 IN | OXYGEN SATURATION: 99 % | BODY MASS INDEX: 23.9 KG/M2 | HEART RATE: 80 BPM | WEIGHT: 140 LBS | RESPIRATION RATE: 19 BRPM

## 2025-01-09 DIAGNOSIS — Z31.61 COUNSELING ABOUT NATURAL FAMILY PLANNING: ICD-10-CM

## 2025-01-09 DIAGNOSIS — Z30.09 GENERAL COUNSELING AND ADVICE ON CONTRACEPTIVE MANAGEMENT: ICD-10-CM

## 2025-01-09 PROCEDURE — 99214 OFFICE O/P EST MOD 30 MIN: CPT | Mod: PBBFAC | Performed by: ADVANCED PRACTICE MIDWIFE

## 2025-01-09 PROCEDURE — 99999 PR PBB SHADOW E&M-EST. PATIENT-LVL IV: CPT | Mod: PBBFAC,,, | Performed by: ADVANCED PRACTICE MIDWIFE

## 2025-01-09 PROCEDURE — 0503F POSTPARTUM CARE VISIT: CPT | Mod: ,,, | Performed by: ADVANCED PRACTICE MIDWIFE

## 2025-01-09 NOTE — PROGRESS NOTES
Subjective:       Dalila Adamson is a 39 y.o. female who presents for a postpartum visit. She is 9 weeks postpartum following a vacuum assisted vaginal delivery with shoulder dystocia. I have fully reviewed the prenatal and intrapartum course. The delivery was at 38wk gestational weeks. Outcome: vacuum, mid. Anesthesia: epidural. Postpartum course has been uncomplicated. Baby's course has been normal. Baby is feeding by bottle. Bleeding no bleeding. Had 1st cycle approx 1 week ago. Bowel function is normal. Bladder function is normal. Patient is sexually active. Contraception method is  natural family planning . Postpartum depression screening: negative.  Last pap 2024, negative    The following portions of the patient's history were reviewed and updated as appropriate: allergies, current medications, past family history, past medical history, past social history, past surgical history, and problem list.    Review of Systems  Pertinent items are noted in HPI.   OB History    Para Term  AB Living   2 1 1 0 1 1   SAB IAB Ectopic Multiple Live Births   1 0 0 0 1      # Outcome Date GA Lbr Naeem/2nd Weight Sex Type Anes PTL Lv   2 Term 24 38w2d 18:33 / 00:07 3.059 kg (6 lb 11.9 oz) F Vag-Vacuum EPI N ABDOUL      Complications: Shoulder Dystocia, Fetal Intolerance      Name: Agnes Adamson      Apgar1: 8  Apgar5: 9   1 SAB  7w0d            Past Medical History:   Diagnosis Date    Hypertension 2024      Past Surgical History:   Procedure Laterality Date    TONSILLECTOMY          Current Outpatient Medications:     blood sugar diagnostic Strp, 1 strip by Misc.(Non-Drug; Combo Route) route 4 (four) times daily. (Patient not taking: Reported on 2025), Disp: 100 strip, Rfl: 11    blood-glucose meter Misc, 1 each by Misc.(Non-Drug; Combo Route) route 4 (four) times daily. for 1 day, Disp: 1 each, Rfl: 0    lancets (UNIVERSAL 1 LANCETS) 30 gauge Misc, 100 lancets by Misc.(Non-Drug; Combo Route)  "route 4 (four) times daily. (Patient not taking: Reported on 1/9/2025), Disp: 100 each, Rfl: 11     Objective:    /80 (BP Location: Left arm, Patient Position: Sitting)   Pulse 80   Resp 19   Ht 5' 4" (1.626 m)   Wt 63.5 kg (140 lb)   SpO2 99%   Breastfeeding No   BMI 24.03 kg/m²    General:  alert, appears stated age, cooperative, and no distress    Breasts:     Lungs: clear to auscultation bilaterally   Heart:  regular rate and rhythm   Abdomen: soft, non-tender; bowel sounds normal; no masses,  no organomegaly     Assessment:     1. Postpartum care and examination        2. General counseling and advice on contraceptive management        3. Counseling about natural family planning           Plan:      1. Contraception: coitus interruptus, condoms, and rhythm method  2. Counseled on tracking cycles and ovulation timing.  3. Follow up in: 1  year  or as needed.     "

## 2025-02-24 ENCOUNTER — PATIENT MESSAGE (OUTPATIENT)
Dept: OBSTETRICS AND GYNECOLOGY | Facility: CLINIC | Age: 40
End: 2025-02-24
Payer: COMMERCIAL

## 2025-02-24 RX ORDER — FLUCONAZOLE 100 MG/1
100 TABLET ORAL DAILY
Qty: 5 TABLET | Refills: 0 | Status: SHIPPED | OUTPATIENT
Start: 2025-02-24 | End: 2025-03-01

## 2025-02-24 RX ORDER — METRONIDAZOLE 500 MG/1
500 TABLET ORAL EVERY 12 HOURS
Qty: 14 TABLET | Refills: 0 | Status: SHIPPED | OUTPATIENT
Start: 2025-02-24 | End: 2025-03-03

## 2025-04-02 ENCOUNTER — PATIENT MESSAGE (OUTPATIENT)
Dept: OBSTETRICS AND GYNECOLOGY | Facility: CLINIC | Age: 40
End: 2025-04-02

## 2025-04-02 DIAGNOSIS — N91.1 SECONDARY AMENORRHEA: Primary | ICD-10-CM

## 2025-04-14 ENCOUNTER — INITIAL PRENATAL (OUTPATIENT)
Dept: OBSTETRICS AND GYNECOLOGY | Facility: CLINIC | Age: 40
End: 2025-04-14

## 2025-04-14 ENCOUNTER — HOSPITAL ENCOUNTER (OUTPATIENT)
Dept: RADIOLOGY | Facility: HOSPITAL | Age: 40
Discharge: HOME | End: 2025-04-14
Attending: ADVANCED PRACTICE MIDWIFE

## 2025-04-14 VITALS
HEART RATE: 80 BPM | DIASTOLIC BLOOD PRESSURE: 70 MMHG | BODY MASS INDEX: 25.06 KG/M2 | SYSTOLIC BLOOD PRESSURE: 110 MMHG | WEIGHT: 146 LBS

## 2025-04-14 DIAGNOSIS — Z3A.10 10 WEEKS GESTATION OF PREGNANCY: ICD-10-CM

## 2025-04-14 DIAGNOSIS — O09.291 HISTORY OF SHOULDER DYSTOCIA IN PRIOR PREGNANCY, CURRENTLY PREGNANT IN FIRST TRIMESTER: ICD-10-CM

## 2025-04-14 DIAGNOSIS — Z86.32 HISTORY OF DIET CONTROLLED GESTATIONAL DIABETES MELLITUS (GDM): ICD-10-CM

## 2025-04-14 DIAGNOSIS — Z32.01 POSITIVE PREGNANCY TEST: Primary | ICD-10-CM

## 2025-04-14 DIAGNOSIS — E55.9 VITAMIN D DEFICIENCY: ICD-10-CM

## 2025-04-14 DIAGNOSIS — Z11.3 SCREENING FOR STD (SEXUALLY TRANSMITTED DISEASE): ICD-10-CM

## 2025-04-14 DIAGNOSIS — N91.1 SECONDARY AMENORRHEA: ICD-10-CM

## 2025-04-14 LAB
AMPHET UR QL SCN: NEGATIVE
BARBITURATES UR QL SCN: NEGATIVE
BENZODIAZ METAB UR QL SCN: NEGATIVE
CANNABINOIDS UR QL SCN: NEGATIVE
CHLAMYDIA BY PCR: NEGATIVE
COCAINE UR QL SCN: NEGATIVE
N. GONORRHOEAE (GC) BY PCR: NEGATIVE
OPIATES UR QL SCN: NEGATIVE
PCP UR QL SCN: NEGATIVE
TRICHOMONAS NAT: NEGATIVE

## 2025-04-14 PROCEDURE — 99999 PR PBB SHADOW E&M-EST. PATIENT-LVL III: CPT | Mod: PBBFAC,,, | Performed by: ADVANCED PRACTICE MIDWIFE

## 2025-04-14 PROCEDURE — 87591 N.GONORRHOEAE DNA AMP PROB: CPT | Mod: ,,, | Performed by: CLINICAL MEDICAL LABORATORY

## 2025-04-14 PROCEDURE — 76801 OB US < 14 WKS SINGLE FETUS: CPT | Mod: 26,,, | Performed by: RADIOLOGY

## 2025-04-14 PROCEDURE — 99213 OFFICE O/P EST LOW 20 MIN: CPT | Mod: PBBFAC,25 | Performed by: ADVANCED PRACTICE MIDWIFE

## 2025-04-14 PROCEDURE — 76801 OB US < 14 WKS SINGLE FETUS: CPT | Mod: TC

## 2025-04-14 PROCEDURE — 99214 OFFICE O/P EST MOD 30 MIN: CPT | Mod: S$PBB,,, | Performed by: ADVANCED PRACTICE MIDWIFE

## 2025-04-14 PROCEDURE — 87491 CHLMYD TRACH DNA AMP PROBE: CPT | Mod: ,,, | Performed by: CLINICAL MEDICAL LABORATORY

## 2025-04-14 PROCEDURE — 80307 DRUG TEST PRSMV CHEM ANLYZR: CPT | Mod: ,,, | Performed by: CLINICAL MEDICAL LABORATORY

## 2025-04-14 PROCEDURE — 87086 URINE CULTURE/COLONY COUNT: CPT | Mod: ,,, | Performed by: CLINICAL MEDICAL LABORATORY

## 2025-04-14 PROCEDURE — 87661 TRICHOMONAS VAGINALIS AMPLIF: CPT | Mod: ,,, | Performed by: CLINICAL MEDICAL LABORATORY

## 2025-04-14 NOTE — PROGRESS NOTES
Initial OB Visit    Subjective:      Dalila Adamson is being seen today for her first obstetrical visit.  This is not a planned pregnancy. She is at  10w 4d gestation. Her obstetrical history is significant for  previous vacuum assisted vaginal delivery x 1 w/ shoulder dystocia 6#11oz, Hx of GDM  . Relationship with FOB: spouse, living together. Patient does intend to breast feed.   Denies vaginal bleeding, abd pain or cramping.    Pregnancy history reviewed.  Problem list updated.    Menstrual History:  OB History    Para Term  AB Living   3 1 1  1 1   SAB IAB Ectopic Multiple Live Births   1   0 1      # Outcome Date GA Lbr Naeem/2nd Weight Sex Type Anes PTL Lv   3 Current            2 Term 24 38w2d 18:33 / 00:07 3.059 kg (6 lb 11.9 oz) F Vag-Vacuum EPI N ABDOUL      Complications: Shoulder Dystocia, Fetal Intolerance   1 SAB  7w0d             Patient's last menstrual period was 2025.  EDC by LMP 25  US today 25  FHTs 159  Single IUP w/ FHT's  Cervix 4.8cm      Past Medical History:   Diagnosis Date    Hypertension 2024      Past Surgical History:   Procedure Laterality Date    TONSILLECTOMY        Current Outpatient Medications   Medication Instructions    blood sugar diagnostic Strp 1 strip, Misc.(Non-Drug; Combo Route), 4 times daily    blood-glucose meter Misc 1 each, Misc.(Non-Drug; Combo Route), 4 times daily    lancets (UNIVERSAL 1 LANCETS) 30 gauge Misc 100 lancets, Misc.(Non-Drug; Combo Route), 4 times daily      The following portions of the patient's history were reviewed and updated as appropriate: allergies, current medications, past family history, past medical history, past social history, past surgical history, and problem list.    Review of Systems  Pertinent items are noted in HPI.      Objective:      /70   Pulse 80   Wt 66.2 kg (146 lb)   LMP 2025   BMI 25.06 kg/m²   General appearance: alert, appears stated age, cooperative, and no  distress  Head: Normocephalic, without obvious abnormality, atraumatic  Lungs: clear to auscultation bilaterally and even/unlabored  Heart: regular rate and rhythm  Abdomen: soft, non-tender  Extremities: extremities normal, atraumatic, no cyanosis or edema  Skin: Skin color, texture, turgor normal. No rashes or lesions      Assessment:     1. Positive pregnancy test    2. 10 weeks gestation of pregnancy    3. Vitamin D deficiency    4. History of shoulder dystocia in prior pregnancy, currently pregnant in first trimester    5. History of diet controlled gestational diabetes mellitus (GDM)    6. Screening for STD (sexually transmitted disease)         Plan:      Initial labs drawn.  [place] Screen discussed.  Prenatal vitamins daily  New OB booklet given for pt to review.    Discussed midwifery care in collaboration with Dr Amaya and Dr Del Rio.    Reviewed healthy diet, exercise during pregnancy and weight gain recommendations.    Safe OTC med list given and reviewed.    Discussed danger s/s to report including bleeding precautions.      Breastfeeding  - Discussed benefits of breastfeeding for mother and baby and limitations of formula  - Educated mother on milk and milk production  - Encouraged to feed infant on demand  - Needs 8-12 feeds in 24 hrs  - Does not need to go more then 4-5 hours with out a feed  - Reviewed feeding cues, feeding patterns and positions  - Encouraged patient to review pregnancy guide for additional information    Encouraged My[employer] Access    Follow up in about 5 weeks (around 5/19/2025) for OBV.

## 2025-04-16 LAB — UA COMPLETE W REFLEX CULTURE PNL UR: NORMAL

## 2025-05-19 ENCOUNTER — ROUTINE PRENATAL (OUTPATIENT)
Dept: OBSTETRICS AND GYNECOLOGY | Facility: CLINIC | Age: 40
End: 2025-05-19

## 2025-05-19 VITALS
WEIGHT: 146 LBS | DIASTOLIC BLOOD PRESSURE: 80 MMHG | SYSTOLIC BLOOD PRESSURE: 120 MMHG | BODY MASS INDEX: 25.06 KG/M2 | HEART RATE: 79 BPM

## 2025-05-19 DIAGNOSIS — O09.292 HISTORY OF SHOULDER DYSTOCIA IN PRIOR PREGNANCY, CURRENTLY PREGNANT IN SECOND TRIMESTER: Primary | ICD-10-CM

## 2025-05-19 DIAGNOSIS — R35.0 URINARY FREQUENCY: ICD-10-CM

## 2025-05-19 DIAGNOSIS — Z3A.15 15 WEEKS GESTATION OF PREGNANCY: ICD-10-CM

## 2025-05-19 DIAGNOSIS — Z86.32 HISTORY OF DIET CONTROLLED GESTATIONAL DIABETES MELLITUS (GDM): ICD-10-CM

## 2025-05-19 PROCEDURE — 99213 OFFICE O/P EST LOW 20 MIN: CPT | Mod: PBBFAC | Performed by: ADVANCED PRACTICE MIDWIFE

## 2025-05-19 PROCEDURE — 99999 PR PBB SHADOW E&M-EST. PATIENT-LVL III: CPT | Mod: PBBFAC,,, | Performed by: ADVANCED PRACTICE MIDWIFE

## 2025-05-19 PROCEDURE — 0502F SUBSEQUENT PRENATAL CARE: CPT | Mod: S$PBB,,, | Performed by: ADVANCED PRACTICE MIDWIFE

## 2025-05-19 NOTE — PROGRESS NOTES
Return OB Visit    39 y.o. female  at 15w4d   She denies any complaints.   Reports occasional fetal movement or fluttering. Denies any vaginal bleeding, leakage of fluid, cramping, contractions, or pressure.   Total weight gain/weight loss in pregnancy: 0 kg (0 lb)     Vitals  BP: 120/80  Pulse: 79  Weight: 66.2 kg (146 lb)  Prenatal  Fetal Heart Rate: 140s  Movement: Absent  Edema  LLE Edema: None  RLE Edema: None  Facial: None  Additional Edema?: No    Prenatal Labs:  Lab Results   Component Value Date    GROUPTRH B POS 2025    HGB 14.5 2025    HCT 41.0 2025     2025    HEPBSAG Non-Reactive 2025    [id number] Non-Reactive 2025    RPR Non-Reactive 2024    LABCHLA Negative 2024    LABNGO Negative 2025    LABURIN Skin/Urogenital Sabrina Isolated, no further workup. 2025       A: 15w4d           ICD-10-CM ICD-9-CM    1. History of shoulder dystocia in prior pregnancy, currently pregnant in second trimester  O09.292 V23.49 US OB/GYN Procedure ([facility]) - Extended List - Future      2. History of diet controlled gestational diabetes mellitus (GDM)  Z86.32 V12.21 US OB/GYN Procedure ([facility]) - Extended List - Future      3. 15 weeks gestation of pregnancy  Z3A.15 V22.2 POCT URINALYSIS W/O SCOPE      4. Urinary frequency  R35.0 788.41 POCT URINALYSIS W/O SCOPE          The following were addressed during this visit:    13-16 Weeks  - Anatomy Ultrasound   - Breastfeeding Concerns & Resources   - Importance of Early Skin to Skin Contact     -Benefits of breastfeeding   -Rooming In and Skin to Skin    P: Bleeding and  labor/labor precautions discussed.    Questions answered to desired level of satisfaction  Verbalized understanding to all information and instructions provided.  Follow up in about 4 weeks (around 2025) for OBV, Ultrasound (anatomy).    Clarisa Ibarra CNM, DALJIT-BC

## 2025-06-16 ENCOUNTER — ROUTINE PRENATAL (OUTPATIENT)
Dept: OBSTETRICS AND GYNECOLOGY | Facility: CLINIC | Age: 40
End: 2025-06-16

## 2025-06-16 ENCOUNTER — HOSPITAL ENCOUNTER (OUTPATIENT)
Dept: OBSTETRICS AND GYNECOLOGY | Facility: CLINIC | Age: 40
Discharge: HOME | End: 2025-06-16

## 2025-06-16 VITALS
WEIGHT: 148 LBS | SYSTOLIC BLOOD PRESSURE: 110 MMHG | HEART RATE: 80 BPM | DIASTOLIC BLOOD PRESSURE: 70 MMHG | BODY MASS INDEX: 25.4 KG/M2

## 2025-06-16 DIAGNOSIS — Z86.32 HISTORY OF DIET CONTROLLED GESTATIONAL DIABETES MELLITUS (GDM): ICD-10-CM

## 2025-06-16 DIAGNOSIS — O09.292 HISTORY OF SHOULDER DYSTOCIA IN PRIOR PREGNANCY, CURRENTLY PREGNANT IN SECOND TRIMESTER: Primary | ICD-10-CM

## 2025-06-16 DIAGNOSIS — O09.292 HISTORY OF SHOULDER DYSTOCIA IN PRIOR PREGNANCY, CURRENTLY PREGNANT IN SECOND TRIMESTER: ICD-10-CM

## 2025-06-16 DIAGNOSIS — Z3A.19 19 WEEKS GESTATION OF PREGNANCY: ICD-10-CM

## 2025-06-16 DIAGNOSIS — R35.0 URINARY FREQUENCY: ICD-10-CM

## 2025-06-16 PROCEDURE — 0502F SUBSEQUENT PRENATAL CARE: CPT | Mod: ,,, | Performed by: ADVANCED PRACTICE MIDWIFE

## 2025-06-16 PROCEDURE — 99999 PR PBB SHADOW E&M-EST. PATIENT-LVL III: CPT | Mod: PBBFAC,,, | Performed by: ADVANCED PRACTICE MIDWIFE

## 2025-06-16 PROCEDURE — 99213 OFFICE O/P EST LOW 20 MIN: CPT | Mod: PBBFAC | Performed by: ADVANCED PRACTICE MIDWIFE

## 2025-06-16 NOTE — PROGRESS NOTES
Return OB Visit    39 y.o. female  at 19w4d   She denies any complaints. Anatomy scan done today.  Has been checking blood sugars occasionally at home.  Reports fasting has been running 80s-90s.  Instructed to keep log and check some 2hr PP also.   Reports good fetal movement or fluttering. Denies any vaginal bleeding, leakage of fluid, cramping, contractions, or pressure.   Total weight gain/weight loss in pregnancy: 0.907 kg (2 lb)     Vitals  BP: 110/70  Pulse: 80  Weight: 67.1 kg (148 lb)  Prenatal  Fetal Heart Rate: 149  Movement: Present  Edema  LLE Edema: None  RLE Edema: None  Facial: None  Additional Edema?: No    Prenatal Labs:  Lab Results   Component Value Date    GROUPTRH B POS 2025    HGB 14.5 2025    HCT 41.0 2025     2025    HEPBSAG Non-Reactive 2025    [id number] Non-Reactive 2025    RPR Non-Reactive 2024    LABCHLA Negative 2024    LABNGO Negative 2025    LABURIN Skin/Urogenital Sabrina Isolated, no further workup. 2025       A: 19w4d           ICD-10-CM ICD-9-CM    1. History of shoulder dystocia in prior pregnancy, currently pregnant in second trimester  O09.292 V23.49       2. History of diet controlled gestational diabetes mellitus (GDM)  Z86.32 V12.21       3. 19 weeks gestation of pregnancy  Z3A.19 V22.2 POCT URINALYSIS W/O SCOPE      4. Urinary frequency  R35.0 788.41 POCT URINALYSIS W/O SCOPE          The following were addressed during this visit:    17-20 Weeks  - Quickening   - Lifestyle   - Ultrasound   - Importance of Early and Frequent Breastfeeding   - Baby-led Feeding   - Frequent feeding to help assure optimal milk production     -Benefits of breastfeeding   -Rooming In and Skin to Skin    P: Bleeding, daily fetal kick counts, and  labor/labor precautions discussed.    Questions answered to desired level of satisfaction  Verbalized understanding to all information and instructions provided.  Follow up in  about 4 weeks (around 7/14/2025) for OBV.    Clarisa Ibarra CNM, DALJIT-BC

## 2025-07-14 ENCOUNTER — ROUTINE PRENATAL (OUTPATIENT)
Dept: OBSTETRICS AND GYNECOLOGY | Facility: CLINIC | Age: 40
End: 2025-07-14

## 2025-07-14 VITALS
HEART RATE: 79 BPM | SYSTOLIC BLOOD PRESSURE: 110 MMHG | BODY MASS INDEX: 25.92 KG/M2 | WEIGHT: 151 LBS | DIASTOLIC BLOOD PRESSURE: 78 MMHG

## 2025-07-14 DIAGNOSIS — R35.0 URINARY FREQUENCY: ICD-10-CM

## 2025-07-14 DIAGNOSIS — Z3A.23 23 WEEKS GESTATION OF PREGNANCY: ICD-10-CM

## 2025-07-14 DIAGNOSIS — O09.292 HISTORY OF SHOULDER DYSTOCIA IN PRIOR PREGNANCY, CURRENTLY PREGNANT IN SECOND TRIMESTER: Primary | ICD-10-CM

## 2025-07-14 DIAGNOSIS — Z86.32 HISTORY OF DIET CONTROLLED GESTATIONAL DIABETES MELLITUS (GDM): ICD-10-CM

## 2025-07-14 PROCEDURE — 99999 PR PBB SHADOW E&M-EST. PATIENT-LVL III: CPT | Mod: PBBFAC,,, | Performed by: ADVANCED PRACTICE MIDWIFE

## 2025-07-14 PROCEDURE — 99213 OFFICE O/P EST LOW 20 MIN: CPT | Mod: PBBFAC | Performed by: ADVANCED PRACTICE MIDWIFE

## 2025-07-14 PROCEDURE — 0502F SUBSEQUENT PRENATAL CARE: CPT | Mod: ,,, | Performed by: ADVANCED PRACTICE MIDWIFE

## 2025-07-14 NOTE — PROGRESS NOTES
Return OB Visit    39 y.o. female  at 23w4d   She denies any complaints. FBS    Reports good fetal movement or fluttering. Denies any vaginal bleeding, leakage of fluid, cramping, contractions, or pressure.   Total weight gain/weight loss in pregnancy: 2.268 kg (5 lb)     Vitals  BP: 110/78  Pulse: 79  Weight: 68.5 kg (151 lb)  Prenatal  Fetal Heart Rate: 140s  Movement: Present  Edema  LLE Edema: Mild pitting, slight indentation  RLE Edema: Mild pitting, slight indentation  Facial: None  Additional Edema?: No    Prenatal Labs:  Lab Results   Component Value Date    GROUPTRH B POS 2025    HGB 14.5 2025    HCT 41.0 2025     2025    HEPBSAG Non-Reactive 2025    [id number] Non-Reactive 2025    RPR Non-Reactive 2024    LABCHLA Negative 2024    LABNGO Negative 2025    LABURIN Skin/Urogenital Sabrina Isolated, no further workup. 2025       A: 23w4d           ICD-10-CM ICD-9-CM    1. History of shoulder dystocia in prior pregnancy, currently pregnant in second trimester  O09.292 V23.49       2. History of diet controlled gestational diabetes mellitus (GDM)  Z86.32 V12.21 Hemoglobin A1C      Glucose, Random      CBC Auto Differential      3. 23 weeks gestation of pregnancy  Z3A.23 V22.2 POCT URINALYSIS W/O SCOPE      4. Urinary frequency  R35.0 788.41           The following were addressed during this visit:    21-24 Weeks  -  Labor Signs   - Travel During Pregnancy   - Gestational diabetes screening protocol   - Effective Position and Latch   - Risks of Formula Use   - Risks of pacifier use     -Benefits of breastfeeding   -Rooming In and Skin to Skin    P: Bleeding, daily fetal kick counts, and  labor/labor precautions discussed.    Questions answered to desired level of satisfaction  Verbalized understanding to all information and instructions provided.  Follow up in about 4 weeks (around 2025) for OBV.    Clarisa Ibarra CNM,  WHNP-BC

## 2025-08-04 PROBLEM — O09.523 MULTIGRAVIDA OF ADVANCED MATERNAL AGE IN THIRD TRIMESTER: Status: RESOLVED | Noted: 2024-11-06 | Resolved: 2025-08-04

## 2025-08-25 ENCOUNTER — ROUTINE PRENATAL (OUTPATIENT)
Dept: OBSTETRICS AND GYNECOLOGY | Facility: CLINIC | Age: 40
End: 2025-08-25

## 2025-08-25 VITALS
BODY MASS INDEX: 26.61 KG/M2 | SYSTOLIC BLOOD PRESSURE: 112 MMHG | HEART RATE: 75 BPM | WEIGHT: 155 LBS | DIASTOLIC BLOOD PRESSURE: 70 MMHG

## 2025-08-25 DIAGNOSIS — O09.293 HISTORY OF SHOULDER DYSTOCIA IN PRIOR PREGNANCY, CURRENTLY PREGNANT IN THIRD TRIMESTER: Primary | ICD-10-CM

## 2025-08-25 DIAGNOSIS — Z03.79 ENCOUNTER FOR OTHER SUSPECTED MATERNAL AND FETAL CONDITIONS RULED OUT: ICD-10-CM

## 2025-08-25 DIAGNOSIS — Z3A.29 29 WEEKS GESTATION OF PREGNANCY: ICD-10-CM

## 2025-08-25 DIAGNOSIS — Z86.32 HISTORY OF DIET CONTROLLED GESTATIONAL DIABETES MELLITUS (GDM): ICD-10-CM

## 2025-08-25 DIAGNOSIS — R35.0 URINARY FREQUENCY: ICD-10-CM

## 2025-08-25 PROCEDURE — 99213 OFFICE O/P EST LOW 20 MIN: CPT | Mod: PBBFAC | Performed by: ADVANCED PRACTICE MIDWIFE

## 2025-08-25 PROCEDURE — 0502F SUBSEQUENT PRENATAL CARE: CPT | Mod: ,,, | Performed by: ADVANCED PRACTICE MIDWIFE

## 2025-08-25 PROCEDURE — 99999 PR PBB SHADOW E&M-EST. PATIENT-LVL III: CPT | Mod: PBBFAC,,, | Performed by: ADVANCED PRACTICE MIDWIFE
